# Patient Record
Sex: MALE | Race: BLACK OR AFRICAN AMERICAN | NOT HISPANIC OR LATINO | Employment: UNEMPLOYED | URBAN - METROPOLITAN AREA
[De-identification: names, ages, dates, MRNs, and addresses within clinical notes are randomized per-mention and may not be internally consistent; named-entity substitution may affect disease eponyms.]

---

## 2021-11-12 ENCOUNTER — HOSPITAL ENCOUNTER (OUTPATIENT)
Dept: NON INVASIVE DIAGNOSTICS | Facility: HOSPITAL | Age: 56
Discharge: HOME | End: 2021-11-12
Payer: COMMERCIAL

## 2021-11-12 VITALS
HEIGHT: 67 IN | DIASTOLIC BLOOD PRESSURE: 85 MMHG | WEIGHT: 174 LBS | HEART RATE: 60 BPM | SYSTOLIC BLOOD PRESSURE: 161 MMHG | BODY MASS INDEX: 27.31 KG/M2

## 2021-11-12 DIAGNOSIS — I10 HYPERTENSION, ESSENTIAL: ICD-10-CM

## 2021-11-12 LAB
AORTIC ROOT: 2.9 CM
APICAL FOUR CHAMBER EJECTION FRACTION: 17 %
ASCENDING AORTA: 3.1 CM
E WAVE DECELERATION TIME: 135 MS
FRACTIONAL SHORTENING: 8 % (ref 28–44)
INTERVENTRICULAR SEPTUM IN DIASTOLE (PARASTERNAL SHORT AXIS VIEW): 0.7 CM
LAAS-AP2: 18.3 CM2
LAAS-AP4: 22 CM2
LEFT INTERNAL DIMENSION IN SYSTOLE: 5.6 CM (ref 2.1–4)
LEFT VENTRICULAR INTERNAL DIMENSION IN DIASTOLE: 6.1 CM (ref 4.56–6.79)
LEFT VENTRICULAR POSTERIOR WALL IN END DIASTOLE: 0.8 CM
LEFT VENTRICULAR STROKE VOLUME: 40 ML
MV E'TISSUE VEL-SEP: 6 CM/S
MV PEAK A VEL: 0.88 M/S
MV PEAK E VEL: 95 CM/S
RIGHT ATRIAL 2D VOLUME: 25 ML
RIGHT ATRIUM AREA SYSTOLE A4C: 11.1 CM2
RIGHT VENTRICLE ID DIMENSION: 3.8 CM
SL CV LV EF: 20
SL CV PED ECHO LEFT VENTRICLE DIASTOLIC VOLUME (MOD BIPLANE) 2D: 190 ML
SL CV PED ECHO LEFT VENTRICLE SYSTOLIC VOLUME (MOD BIPLANE) 2D: 151 ML
TRICUSPID VALVE PEAK REGURGITATION VELOCITY: 2.13 M/S
TRICUSPID VALVE S': 52 CM/S
TV PEAK GRADIENT: 18 MMHG
Z-SCORE OF LEFT VENTRICULAR DIMENSION IN END SYSTOLE: 0.93

## 2021-11-12 PROCEDURE — 93306 TTE W/DOPPLER COMPLETE: CPT

## 2021-12-09 RX ORDER — UREA 10 %
1 LOTION (ML) TOPICAL DAILY
COMMUNITY
End: 2022-01-06

## 2021-12-09 RX ORDER — DIPHENOXYLATE HYDROCHLORIDE AND ATROPINE SULFATE 2.5; .025 MG/1; MG/1
1 TABLET ORAL DAILY
COMMUNITY
End: 2022-01-06

## 2021-12-09 RX ORDER — ROSUVASTATIN CALCIUM 5 MG/1
5 TABLET, COATED ORAL DAILY
COMMUNITY
Start: 2021-04-23 | End: 2021-12-09 | Stop reason: SDUPTHER

## 2021-12-09 RX ORDER — ROSUVASTATIN CALCIUM 5 MG/1
TABLET, COATED ORAL
COMMUNITY
Start: 2021-10-04 | End: 2021-12-28

## 2021-12-09 RX ORDER — AMLODIPINE BESYLATE 5 MG/1
5 TABLET ORAL DAILY
COMMUNITY
Start: 2021-03-26 | End: 2021-12-09 | Stop reason: SDUPTHER

## 2021-12-14 ENCOUNTER — OFFICE VISIT (OUTPATIENT)
Dept: CARDIOLOGY CLINIC | Facility: CLINIC | Age: 56
End: 2021-12-14
Payer: COMMERCIAL

## 2021-12-14 VITALS
HEIGHT: 67 IN | WEIGHT: 180 LBS | BODY MASS INDEX: 28.25 KG/M2 | DIASTOLIC BLOOD PRESSURE: 70 MMHG | OXYGEN SATURATION: 99 % | SYSTOLIC BLOOD PRESSURE: 124 MMHG | HEART RATE: 79 BPM

## 2021-12-14 DIAGNOSIS — R07.9 CHEST PAIN, UNSPECIFIED TYPE: Primary | ICD-10-CM

## 2021-12-14 DIAGNOSIS — I42.9 CARDIOMYOPATHY, UNSPECIFIED TYPE (HCC): ICD-10-CM

## 2021-12-14 DIAGNOSIS — R06.02 SHORTNESS OF BREATH: ICD-10-CM

## 2021-12-14 PROCEDURE — 93000 ELECTROCARDIOGRAM COMPLETE: CPT | Performed by: INTERNAL MEDICINE

## 2021-12-14 PROCEDURE — 99204 OFFICE O/P NEW MOD 45 MIN: CPT | Performed by: INTERNAL MEDICINE

## 2021-12-14 RX ORDER — ASPIRIN 81 MG/1
81 TABLET, CHEWABLE ORAL DAILY
Qty: 90 TABLET | Refills: 3 | Status: SHIPPED | OUTPATIENT
Start: 2021-12-14

## 2021-12-14 RX ORDER — LOSARTAN POTASSIUM 25 MG/1
12.5 TABLET ORAL DAILY
Qty: 90 TABLET | Refills: 0 | Status: SHIPPED | OUTPATIENT
Start: 2021-12-14 | End: 2021-12-28 | Stop reason: ALTCHOICE

## 2021-12-14 RX ORDER — METOPROLOL SUCCINATE 25 MG/1
25 TABLET, EXTENDED RELEASE ORAL DAILY
Qty: 90 TABLET | Refills: 3 | Status: SHIPPED | OUTPATIENT
Start: 2021-12-14

## 2021-12-14 RX ORDER — ASPIRIN 81 MG/1
324 TABLET, CHEWABLE ORAL ONCE
Status: CANCELLED | OUTPATIENT
Start: 2021-12-14 | End: 2021-12-14

## 2021-12-15 ENCOUNTER — TELEPHONE (OUTPATIENT)
Dept: CARDIOLOGY CLINIC | Facility: CLINIC | Age: 56
End: 2021-12-15

## 2021-12-16 ENCOUNTER — PREP FOR PROCEDURE (OUTPATIENT)
Dept: CARDIOLOGY CLINIC | Facility: CLINIC | Age: 56
End: 2021-12-16

## 2021-12-16 DIAGNOSIS — R07.9 CHEST PAIN, UNSPECIFIED TYPE: Primary | ICD-10-CM

## 2021-12-17 ENCOUNTER — APPOINTMENT (OUTPATIENT)
Dept: LAB | Facility: HOSPITAL | Age: 56
End: 2021-12-17
Attending: INTERNAL MEDICINE
Payer: COMMERCIAL

## 2021-12-17 DIAGNOSIS — R07.9 CHEST PAIN, UNSPECIFIED TYPE: ICD-10-CM

## 2021-12-17 LAB
ALBUMIN SERPL BCP-MCNC: 3.8 G/DL (ref 3.5–5)
ALP SERPL-CCNC: 78 U/L (ref 46–116)
ALT SERPL W P-5'-P-CCNC: 40 U/L (ref 12–78)
ANION GAP SERPL CALCULATED.3IONS-SCNC: 10 MMOL/L (ref 4–13)
AST SERPL W P-5'-P-CCNC: 24 U/L (ref 5–45)
BASOPHILS # BLD AUTO: 0.06 THOUSANDS/ΜL (ref 0–0.1)
BASOPHILS NFR BLD AUTO: 1 % (ref 0–1)
BILIRUB SERPL-MCNC: 0.24 MG/DL (ref 0.2–1)
BUN SERPL-MCNC: 15 MG/DL (ref 5–25)
CALCIUM SERPL-MCNC: 9.3 MG/DL (ref 8.3–10.1)
CHLORIDE SERPL-SCNC: 104 MMOL/L (ref 100–108)
CO2 SERPL-SCNC: 27 MMOL/L (ref 21–32)
CREAT SERPL-MCNC: 0.9 MG/DL (ref 0.6–1.3)
EOSINOPHIL # BLD AUTO: 0.18 THOUSAND/ΜL (ref 0–0.61)
EOSINOPHIL NFR BLD AUTO: 2 % (ref 0–6)
ERYTHROCYTE [DISTWIDTH] IN BLOOD BY AUTOMATED COUNT: 14.6 % (ref 11.6–15.1)
GFR SERPL CREATININE-BSD FRML MDRD: 96 ML/MIN/1.73SQ M
GLUCOSE SERPL-MCNC: 112 MG/DL (ref 65–140)
HCT VFR BLD AUTO: 42.7 % (ref 36.5–49.3)
HGB BLD-MCNC: 13.5 G/DL (ref 12–17)
IMM GRANULOCYTES # BLD AUTO: 0.05 THOUSAND/UL (ref 0–0.2)
IMM GRANULOCYTES NFR BLD AUTO: 1 % (ref 0–2)
LYMPHOCYTES # BLD AUTO: 4.1 THOUSANDS/ΜL (ref 0.6–4.47)
LYMPHOCYTES NFR BLD AUTO: 38 % (ref 14–44)
MCH RBC QN AUTO: 27.3 PG (ref 26.8–34.3)
MCHC RBC AUTO-ENTMCNC: 31.6 G/DL (ref 31.4–37.4)
MCV RBC AUTO: 86 FL (ref 82–98)
MONOCYTES # BLD AUTO: 1.09 THOUSAND/ΜL (ref 0.17–1.22)
MONOCYTES NFR BLD AUTO: 10 % (ref 4–12)
NEUTROPHILS # BLD AUTO: 5.41 THOUSANDS/ΜL (ref 1.85–7.62)
NEUTS SEG NFR BLD AUTO: 48 % (ref 43–75)
NRBC BLD AUTO-RTO: 0 /100 WBCS
PLATELET # BLD AUTO: 220 THOUSANDS/UL (ref 149–390)
PMV BLD AUTO: 10.4 FL (ref 8.9–12.7)
POTASSIUM SERPL-SCNC: 4.3 MMOL/L (ref 3.5–5.3)
PROT SERPL-MCNC: 7.7 G/DL (ref 6.4–8.2)
RBC # BLD AUTO: 4.94 MILLION/UL (ref 3.88–5.62)
SODIUM SERPL-SCNC: 141 MMOL/L (ref 136–145)
WBC # BLD AUTO: 10.89 THOUSAND/UL (ref 4.31–10.16)

## 2021-12-17 PROCEDURE — 80053 COMPREHEN METABOLIC PANEL: CPT

## 2021-12-17 PROCEDURE — 36415 COLL VENOUS BLD VENIPUNCTURE: CPT

## 2021-12-17 PROCEDURE — 85025 COMPLETE CBC W/AUTO DIFF WBC: CPT

## 2021-12-22 ENCOUNTER — HOSPITAL ENCOUNTER (OUTPATIENT)
Facility: HOSPITAL | Age: 56
Setting detail: OUTPATIENT SURGERY
Discharge: HOME | End: 2021-12-22
Attending: INTERNAL MEDICINE | Admitting: INTERNAL MEDICINE
Payer: COMMERCIAL

## 2021-12-22 VITALS
HEART RATE: 63 BPM | SYSTOLIC BLOOD PRESSURE: 137 MMHG | RESPIRATION RATE: 16 BRPM | OXYGEN SATURATION: 99 % | TEMPERATURE: 98.3 F | HEIGHT: 67 IN | DIASTOLIC BLOOD PRESSURE: 85 MMHG | BODY MASS INDEX: 28.25 KG/M2 | WEIGHT: 180 LBS

## 2021-12-22 DIAGNOSIS — I42.9 CARDIOMYOPATHY, UNSPECIFIED TYPE (HCC): ICD-10-CM

## 2021-12-22 PROBLEM — Z98.890 S/P CARDIAC CATH: Status: ACTIVE | Noted: 2021-12-22

## 2021-12-22 PROBLEM — I51.9 CARDIOPATHY: Chronic | Status: ACTIVE | Noted: 2021-12-22

## 2021-12-22 LAB
ANION GAP SERPL CALCULATED.3IONS-SCNC: 3 MMOL/L (ref 4–13)
APTT PPP: 28 SECONDS (ref 23–37)
ATRIAL RATE: 63 BPM
BUN SERPL-MCNC: 16 MG/DL (ref 5–25)
CALCIUM SERPL-MCNC: 9.1 MG/DL (ref 8.3–10.1)
CHLORIDE SERPL-SCNC: 110 MMOL/L (ref 100–108)
CO2 SERPL-SCNC: 27 MMOL/L (ref 21–32)
CREAT SERPL-MCNC: 0.99 MG/DL (ref 0.6–1.3)
ERYTHROCYTE [DISTWIDTH] IN BLOOD BY AUTOMATED COUNT: 14.7 % (ref 11.6–15.1)
GFR SERPL CREATININE-BSD FRML MDRD: 85 ML/MIN/1.73SQ M
GLUCOSE P FAST SERPL-MCNC: 116 MG/DL (ref 65–99)
GLUCOSE SERPL-MCNC: 116 MG/DL (ref 65–140)
HCT VFR BLD AUTO: 40.5 % (ref 36.5–49.3)
HGB BLD-MCNC: 12.9 G/DL (ref 12–17)
MCH RBC QN AUTO: 27.9 PG (ref 26.8–34.3)
MCHC RBC AUTO-ENTMCNC: 31.9 G/DL (ref 31.4–37.4)
MCV RBC AUTO: 88 FL (ref 82–98)
P AXIS: 41 DEGREES
PLATELET # BLD AUTO: 195 THOUSANDS/UL (ref 149–390)
PMV BLD AUTO: 10.1 FL (ref 8.9–12.7)
POTASSIUM SERPL-SCNC: 4.3 MMOL/L (ref 3.5–5.3)
PR INTERVAL: 138 MS
QRS AXIS: 52 DEGREES
QRSD INTERVAL: 108 MS
QT INTERVAL: 424 MS
QTC INTERVAL: 433 MS
RBC # BLD AUTO: 4.62 MILLION/UL (ref 3.88–5.62)
SODIUM SERPL-SCNC: 140 MMOL/L (ref 136–145)
T WAVE AXIS: 265 DEGREES
VENTRICULAR RATE: 63 BPM
WBC # BLD AUTO: 8.21 THOUSAND/UL (ref 4.31–10.16)

## 2021-12-22 PROCEDURE — 85730 THROMBOPLASTIN TIME PARTIAL: CPT | Performed by: INTERNAL MEDICINE

## 2021-12-22 PROCEDURE — 93005 ELECTROCARDIOGRAM TRACING: CPT

## 2021-12-22 PROCEDURE — 93010 ELECTROCARDIOGRAM REPORT: CPT | Performed by: INTERNAL MEDICINE

## 2021-12-22 PROCEDURE — 85027 COMPLETE CBC AUTOMATED: CPT | Performed by: INTERNAL MEDICINE

## 2021-12-22 PROCEDURE — 93454 CORONARY ARTERY ANGIO S&I: CPT | Performed by: INTERNAL MEDICINE

## 2021-12-22 PROCEDURE — 99152 MOD SED SAME PHYS/QHP 5/>YRS: CPT | Performed by: INTERNAL MEDICINE

## 2021-12-22 PROCEDURE — 99153 MOD SED SAME PHYS/QHP EA: CPT | Performed by: INTERNAL MEDICINE

## 2021-12-22 PROCEDURE — C1894 INTRO/SHEATH, NON-LASER: HCPCS | Performed by: INTERNAL MEDICINE

## 2021-12-22 PROCEDURE — C1769 GUIDE WIRE: HCPCS | Performed by: INTERNAL MEDICINE

## 2021-12-22 PROCEDURE — 80048 BASIC METABOLIC PNL TOTAL CA: CPT | Performed by: INTERNAL MEDICINE

## 2021-12-22 RX ORDER — HEPARIN SODIUM 1000 [USP'U]/ML
INJECTION, SOLUTION INTRAVENOUS; SUBCUTANEOUS AS NEEDED
Status: DISCONTINUED | OUTPATIENT
Start: 2021-12-22 | End: 2021-12-22 | Stop reason: HOSPADM

## 2021-12-22 RX ORDER — NITROGLYCERIN 20 MG/100ML
INJECTION INTRAVENOUS AS NEEDED
Status: DISCONTINUED | OUTPATIENT
Start: 2021-12-22 | End: 2021-12-22 | Stop reason: HOSPADM

## 2021-12-22 RX ORDER — FENTANYL CITRATE 50 UG/ML
INJECTION, SOLUTION INTRAMUSCULAR; INTRAVENOUS AS NEEDED
Status: DISCONTINUED | OUTPATIENT
Start: 2021-12-22 | End: 2021-12-22 | Stop reason: HOSPADM

## 2021-12-22 RX ORDER — ACETAMINOPHEN 325 MG/1
650 TABLET ORAL EVERY 4 HOURS PRN
Status: DISCONTINUED | OUTPATIENT
Start: 2021-12-22 | End: 2021-12-22 | Stop reason: HOSPADM

## 2021-12-22 RX ORDER — SODIUM CHLORIDE 9 MG/ML
125 INJECTION, SOLUTION INTRAVENOUS CONTINUOUS
Status: DISCONTINUED | OUTPATIENT
Start: 2021-12-22 | End: 2021-12-22

## 2021-12-22 RX ORDER — ONDANSETRON 2 MG/ML
4 INJECTION INTRAMUSCULAR; INTRAVENOUS EVERY 6 HOURS PRN
Status: DISCONTINUED | OUTPATIENT
Start: 2021-12-22 | End: 2021-12-22 | Stop reason: HOSPADM

## 2021-12-22 RX ORDER — MIDAZOLAM HYDROCHLORIDE 2 MG/2ML
INJECTION, SOLUTION INTRAMUSCULAR; INTRAVENOUS AS NEEDED
Status: DISCONTINUED | OUTPATIENT
Start: 2021-12-22 | End: 2021-12-22 | Stop reason: HOSPADM

## 2021-12-22 RX ORDER — SODIUM CHLORIDE 9 MG/ML
125 INJECTION, SOLUTION INTRAVENOUS CONTINUOUS
Status: DISCONTINUED | OUTPATIENT
Start: 2021-12-22 | End: 2021-12-22 | Stop reason: HOSPADM

## 2021-12-22 RX ORDER — ASPIRIN 81 MG/1
324 TABLET, CHEWABLE ORAL ONCE
Status: COMPLETED | OUTPATIENT
Start: 2021-12-22 | End: 2021-12-22

## 2021-12-22 RX ORDER — LIDOCAINE HYDROCHLORIDE 10 MG/ML
INJECTION, SOLUTION EPIDURAL; INFILTRATION; INTRACAUDAL; PERINEURAL AS NEEDED
Status: DISCONTINUED | OUTPATIENT
Start: 2021-12-22 | End: 2021-12-22 | Stop reason: HOSPADM

## 2021-12-22 RX ORDER — VERAPAMIL HCL 2.5 MG/ML
AMPUL (ML) INTRAVENOUS AS NEEDED
Status: DISCONTINUED | OUTPATIENT
Start: 2021-12-22 | End: 2021-12-22 | Stop reason: HOSPADM

## 2021-12-22 RX ADMIN — ASPIRIN 81 MG CHEWABLE TABLET 324 MG: 81 TABLET CHEWABLE at 07:14

## 2021-12-22 RX ADMIN — SODIUM CHLORIDE 125 ML/HR: 0.9 INJECTION, SOLUTION INTRAVENOUS at 07:16

## 2021-12-27 ENCOUNTER — TELEPHONE (OUTPATIENT)
Dept: CARDIOLOGY CLINIC | Facility: CLINIC | Age: 56
End: 2021-12-27

## 2021-12-28 DIAGNOSIS — I42.9 CARDIOMYOPATHY, UNSPECIFIED TYPE (HCC): Primary | ICD-10-CM

## 2021-12-28 DIAGNOSIS — I25.10 CORONARY ARTERY DISEASE INVOLVING NATIVE CORONARY ARTERY OF NATIVE HEART WITHOUT ANGINA PECTORIS: ICD-10-CM

## 2021-12-28 RX ORDER — ROSUVASTATIN CALCIUM 20 MG/1
20 TABLET, COATED ORAL DAILY
Qty: 90 TABLET | Refills: 3 | Status: SHIPPED | OUTPATIENT
Start: 2021-12-28

## 2022-01-03 ENCOUNTER — TELEPHONE (OUTPATIENT)
Dept: CARDIOLOGY CLINIC | Facility: CLINIC | Age: 57
End: 2022-01-03

## 2022-01-04 NOTE — TELEPHONE ENCOUNTER
Per [employer] patient's current entresto price is $1600  Due to a $7450 00 deductible   Patient was instructed to obtain a $10 00 coupon card

## 2022-01-06 ENCOUNTER — OFFICE VISIT (OUTPATIENT)
Dept: CARDIOLOGY CLINIC | Facility: CLINIC | Age: 57
End: 2022-01-06
Payer: COMMERCIAL

## 2022-01-06 VITALS
HEART RATE: 79 BPM | OXYGEN SATURATION: 97 % | HEIGHT: 67 IN | BODY MASS INDEX: 28.63 KG/M2 | WEIGHT: 182.4 LBS | DIASTOLIC BLOOD PRESSURE: 80 MMHG | SYSTOLIC BLOOD PRESSURE: 142 MMHG

## 2022-01-06 DIAGNOSIS — I42.9 CARDIOMYOPATHY, UNSPECIFIED TYPE (HCC): Primary | ICD-10-CM

## 2022-01-06 DIAGNOSIS — I10 HYPERTENSION, UNSPECIFIED TYPE: ICD-10-CM

## 2022-01-06 DIAGNOSIS — Z72.0 TOBACCO ABUSE: ICD-10-CM

## 2022-01-06 DIAGNOSIS — E78.5 HYPERLIPIDEMIA, UNSPECIFIED HYPERLIPIDEMIA TYPE: ICD-10-CM

## 2022-01-06 PROCEDURE — 99215 OFFICE O/P EST HI 40 MIN: CPT | Performed by: NURSE PRACTITIONER

## 2022-01-06 NOTE — PROGRESS NOTES
Cardiology Follow Up    Nathalie Grant  1965  [id number]  [facility] [address]  [phone]    1  Cardiomyopathy, unspecified type ([facility] [address] )  MRI cardiac  w wo contrast   2  Hypertension, unspecified type     3  Hyperlipidemia, unspecified hyperlipidemia type     4  Tobacco abuse         Interval History:   On 12/22/21 Mr Osiel Peralta underwent a [facility] due to newly discovered CM LVEF 20%  [facility] showed  1 vessel CAD with chronically occluded circumflex vessel  1st diagonal lesion 90% stenosis small vessel small territory medical management  Proximal circumflex 100% stenosis, mid RCA 40% stenosis, proximal RCA 30% stenosis  Mr Nathalie Grant presents to our office for a recent [facility] follow up visit  He [facility] today  I have reviewed the results to the [facility]  Denies dyspnea with minimal exertion chest pain palpitations lightheadedness or dizziness  He is abiding by 2 g sodium diet fluid restriction  His weight in the office is 182 lb  HPI:  Hypertension  Hyperlipidemia  Tobacco use   11/12/21 TTE: LV  Cavity mildly dilated, wall thickness mildly increased, increased trabeculation in the apex of the LV myocardium suggesting LV noncompaction morphology  LVEF 20%  Systolic function severely reduced  Akinesis and thinning of the LV myocardium from the mid to basal aspect of the inferior wall  Grade 2 diastolic dysfunction left atrium mildly dilated, aortic valve mild regurgitation  Mitral valve thickening of the anterior leaflet and posterior leaflet mild chordal involvement,   Patient Active Problem List   Diagnosis    [facility]    Cardiopathy    S/P cardiac cath     No past medical history on file    Social History     Socioeconomic History    Marital status: Single     Spouse name: Not on file    Number of children: Not on file    Years of education: Not on file    Highest education level: Not on file   Occupational History    Not on file   Tobacco Use    Smoking status: Current Every Day Smoker     Start date: 12/14/1990    Smokeless tobacco: Never Used   Substance and Sexual Activity    Alcohol use: Yes     Alcohol/week: 1 0 standard drink     Types: 1 Cans of beer per week    Drug use: Yes     Frequency: 7 0 times per week     Types: Marijuana    Sexual activity: Yes     Partners: Female   Other Topics Concern    Not on file   Social History Narrative    Not on file     Social Determinants of Health     Financial Resource Strain: Not on file   Food Insecurity: Not on file   Transportation Needs: Not on file   Physical Activity: Not on file   Stress: Not on file   Social Connections: Not on file   Intimate Partner Violence: Not on file   Housing Stability: Not on file      No family history on file  Past Surgical History:   Procedure Laterality Date    CARDIAC CATHETERIZATION Left 12/22/2021    Procedure: CARDIAC CORONARY ANGIOGRAM;  Surgeon: Reyes Garduno DO;  [facility];   Service: Cardiology       Current Outpatient Medications:     aspirin 81 mg chewable tablet, Chew 1 tablet (81 mg total) daily, Disp: 90 tablet, Rfl: 3    Cholecalciferol 25 MCG (1000 UT) tablet, Take 1,000 Units by mouth daily, Disp: , Rfl:     metoprolol succinate (TOPROL-XL) 25 mg 24 hr tablet, Take 1 tablet (25 mg total) by mouth daily, Disp: 90 tablet, Rfl: 3    rosuvastatin (CRESTOR) 20 MG tablet, Take 1 tablet (20 mg total) by mouth daily, Disp: 90 tablet, Rfl: 3    sacubitril-valsartan (ENTRESTO) 24-26 MG TABS, Take 1 tablet by mouth 2 (two) times a day, Disp: 180 tablet, Rfl: 0  No Known Allergies    Labs:  Admission on 12/22/2021, Discharged on 12/22/2021   Component Date Value    PTT 12/22/2021 28     Sodium 12/22/2021 140     Potassium 12/22/2021 4 3     Chloride 12/22/2021 110*    CO2 12/22/2021 27     ANION GAP 12/22/2021 3*    BUN 12/22/2021 16     Creatinine 12/22/2021 0 99     Glucose 12/22/2021 116     Glucose, Fasting 12/22/2021 116*    Calcium 12/22/2021 9 1     eGFR 12/22/2021 85     WBC 12/22/2021 8 21     RBC 12/22/2021 4 62     Hemoglobin 12/22/2021 12 9     Hematocrit 12/22/2021 40 5     MCV 12/22/2021 88     MCH 12/22/2021 27 9     MCHC 12/22/2021 31 9     RDW 12/22/2021 14 7     Platelets 70/99/4515 195     MPV 12/22/2021 10 1     Ventricular Rate 12/22/2021 63     Atrial Rate 12/22/2021 63     RI Interval 12/22/2021 138     QRSD Interval 12/22/2021 108     QT Interval 12/22/2021 424     QTC Interval 12/22/2021 433     P Axis 12/22/2021 41     QRS Axis 12/22/2021 52     T Wave Axis 12/22/2021 265    Appointment on 12/17/2021   Component Date Value    Sodium 12/17/2021 141     Potassium 12/17/2021 4 3     Chloride 12/17/2021 104     CO2 12/17/2021 27     ANION GAP 12/17/2021 10     BUN 12/17/2021 15     Creatinine 12/17/2021 0 90     Glucose 12/17/2021 112     Calcium 12/17/2021 9 3     AST 12/17/2021 24     ALT 12/17/2021 40     Alkaline Phosphatase 12/17/2021 78     Total Protein 12/17/2021 7 7     Albumin 12/17/2021 3 8     Total Bilirubin 12/17/2021 0 24     eGFR 12/17/2021 96     WBC 12/17/2021 10 89*    RBC 12/17/2021 4 94     Hemoglobin 12/17/2021 13 5     Hematocrit 12/17/2021 42 7     MCV 12/17/2021 86     MCH 12/17/2021 27 3     MCHC 12/17/2021 31 6     RDW 12/17/2021 14 6     MPV 12/17/2021 10 4     Platelets 77/51/5555 220     nRBC 12/17/2021 0     Neutrophils Relative 12/17/2021 48     Immat GRANS % 12/17/2021 1     Lymphocytes Relative 12/17/2021 38     Monocytes Relative 12/17/2021 10     Eosinophils Relative 12/17/2021 2     Basophils Relative 12/17/2021 1     Neutrophils Absolute 12/17/2021 5 41     Immature Grans Absolute 12/17/2021 0 05     Lymphocytes Absolute 12/17/2021 4 10     Monocytes Absolute 12/17/2021 1 09     Eosinophils Absolute 12/17/2021 0 18     Basophils Absolute 12/17/2021 0 06    Hospital Outpatient Visit on 11/12/2021   Component Date Value    TV S' 11/12/2021 52 0     TV peak gradient 11/12/2021 18     Tricuspid valve peak reg* 11/12/2021 2 13     LVPWd 11/12/2021 0 80     Left Atrium Area-systoli* 11/12/2021 18 3     Left Atrium Area-systoli* 11/12/2021 22     MV E' Tissue Velocity Se* 11/12/2021 6     RA 2D Volume 11/12/2021 25 0     IVSd 11/12/2021 8 17     LV DIASTOLIC VOLUME (MOD* 70/50/6027 190     LEFT VENTRICLE SYSTOLIC * 48/05/4245 848     Left ventricular stroke * 11/12/2021 40 00     A4C EF 11/12/2021 17     LVIDd 11/12/2021 6 10     LVIDS 11/12/2021 5 60     FS 11/12/2021 8     Asc Ao 11/12/2021 3 1     Ao root 11/12/2021 2 90     RVID d 11/12/2021 3 8     E wave deceleration time 11/12/2021 135     MV Peak E Blaise 11/12/2021 95     MV Peak A Blaise 11/12/2021 0 88     RAA A4C 11/12/2021 11 1     ZLVIDS 11/12/2021 0 93     LV EF 11/12/2021 20      Imaging: Cardiac catheterization    Addendum Date: 12/22/2021 Addendum:   · 1V CAD with chronically occluded circumflex vessel · Mixed cardiomyopathy, mostly nonischemic features as occluded circumflex does not explain LVEF 20% · 1st Diag lesion is 90% stenosed, small vessel, small territory (medical therapy) · Prox Cx lesion is 100% stenosed  · Mid RCA lesion is 40% stenosed  · Prox RCA lesion is 30% stenosed  Result Date: 12/22/2021  Narrative: · 1V CAD with chronically occluded circumflex vessel · Mixed cardiomyopathy, mostly nonischemic features as occluded circumflex does not explain LVEF 20% · 1st Diag lesion is 90% stenosed, small vessel, small territory (medical therapy) · Prox Cx lesion is 100% stenosed  · Mid RCA lesion is 40% stenosed  · Prox RCA lesion is 30% stenosed  Review of Systems:  Review of Systems   Constitutional: Positive for fatigue  Respiratory: Positive for shortness of breath           With moderate exertion    All other systems reviewed and are negative  Physical Exam:  Physical Exam  Vitals reviewed  Constitutional:       Appearance: Normal appearance  HENT:      Head: Normocephalic  Eyes:      Extraocular Movements: Extraocular movements intact  Pupils: Pupils are equal, round, and reactive to light  Neck:      Comments: No JVD  Cardiovascular:      Rate and Rhythm: Normal rate and regular rhythm  Pulses: Normal pulses  Heart sounds: Normal heart sounds  Pulmonary:      Effort: Pulmonary effort is normal       Breath sounds: Normal breath sounds  Abdominal:      General: Bowel sounds are normal       Palpations: Abdomen is soft  Musculoskeletal:         General: Normal range of motion  Cervical back: Normal range of motion and neck supple  Right lower leg: No edema  Left lower leg: No edema  Skin:     General: Skin is warm and dry  Capillary Refill: Capillary refill takes less than 2 seconds  Neurological:      General: No focal deficit present  Mental Status: He is alert and oriented to person, place, and time  Psychiatric:         Mood and Affect: Mood normal          Behavior: Behavior normal          Discussion/Summary:  1  Mixed Ischemic and non ischemic CM LVEF 20% LVIDd 6 1cm, TTE showed increased Trabeculation in the apex of LV suggestive of non compaction morphology  LHC showed  % stenosis  Colette Ga started Cite [employer] this morning  Continue Entresto 24-26 mg b i d  Metoprolol succinate 25 mg daily, Crestor 20 mg daily, aspirin 81 mg daily  Follow-up cardiac MRI in the near future  Discussed goals up titrate medications and follow-up TTE in 3 months to evaluate LVEF if no improvement with suggest he undergo insertion of ICD  I have reinforced heart failure education 2 g sodium diet 1500 cc to 2 L fluid restriction and daily weights    I have educated on Colette Ga when to call our office if he experiences heart failure symptoms, I would suggest Heart Failure consult for co management of this patient  2  Hypertension continue on Entresto 24-26mg BID up titrate in the near future if tolerating  Continue metoprolol succinate 25 mg daily  BMP in one week  3  Hyperlipidemia 3/26/21 , , HDL 36, ,  Continue on Crestor 20 mg daily the follow-up lipid panel in the near future heart healthy diet  4   Tobacco use continues to smoke,encourage smoking cessation

## 2022-01-06 NOTE — PATIENT INSTRUCTIONS
Maintain a 2 gram daily sodium diet and 1500 ml daily fluid restriction  Check daily weights  If you gained 3 pounds in one day, 5 pounds in one week, or experience worsening shortness of breath or increasing lower leg swelling  Please call the heart failure office at [phone]    Please bring a  list of your current medications and daily weights to the office visit    Cardiac MRI

## 2022-01-18 ENCOUNTER — HOSPITAL ENCOUNTER (OUTPATIENT)
Dept: MRI IMAGING | Facility: HOSPITAL | Age: 57
Discharge: HOME | End: 2022-01-18
Payer: COMMERCIAL

## 2022-01-18 DIAGNOSIS — I42.9 CARDIOMYOPATHY, UNSPECIFIED TYPE (HCC): ICD-10-CM

## 2022-01-18 PROCEDURE — G1004 CDSM NDSC: HCPCS

## 2022-01-18 PROCEDURE — 75561 CARDIAC MRI FOR MORPH W/DYE: CPT

## 2022-01-18 PROCEDURE — A9585 GADOBUTROL INJECTION: HCPCS | Performed by: NURSE PRACTITIONER

## 2022-01-18 RX ADMIN — GADOBUTROL 16 ML: 604.72 INJECTION INTRAVENOUS at 19:46

## 2022-01-20 ENCOUNTER — TELEPHONE (OUTPATIENT)
Dept: CARDIOLOGY CLINIC | Facility: CLINIC | Age: 57
End: 2022-01-20

## 2022-01-20 ENCOUNTER — CLINICAL SUPPORT (OUTPATIENT)
Dept: CARDIOLOGY CLINIC | Facility: CLINIC | Age: 57
End: 2022-01-20
Payer: COMMERCIAL

## 2022-01-20 ENCOUNTER — APPOINTMENT (OUTPATIENT)
Dept: LAB | Facility: HOSPITAL | Age: 57
End: 2022-01-20
Attending: INTERNAL MEDICINE
Payer: COMMERCIAL

## 2022-01-20 VITALS
HEIGHT: 67 IN | BODY MASS INDEX: 28.58 KG/M2 | DIASTOLIC BLOOD PRESSURE: 80 MMHG | HEART RATE: 72 BPM | SYSTOLIC BLOOD PRESSURE: 134 MMHG | OXYGEN SATURATION: 98 % | WEIGHT: 182.1 LBS

## 2022-01-20 DIAGNOSIS — I42.9 CARDIOMYOPATHY, UNSPECIFIED TYPE (HCC): ICD-10-CM

## 2022-01-20 DIAGNOSIS — I10 HYPERTENSION, UNSPECIFIED TYPE: Primary | ICD-10-CM

## 2022-01-20 LAB
ANION GAP SERPL CALCULATED.3IONS-SCNC: 10 MMOL/L (ref 4–13)
BUN SERPL-MCNC: 14 MG/DL (ref 5–25)
CALCIUM SERPL-MCNC: 8.7 MG/DL (ref 8.3–10.1)
CHLORIDE SERPL-SCNC: 103 MMOL/L (ref 100–108)
CO2 SERPL-SCNC: 26 MMOL/L (ref 21–32)
CREAT SERPL-MCNC: 1.11 MG/DL (ref 0.6–1.3)
GFR SERPL CREATININE-BSD FRML MDRD: 74 ML/MIN/1.73SQ M
GLUCOSE SERPL-MCNC: 211 MG/DL (ref 65–140)
POTASSIUM SERPL-SCNC: 4.8 MMOL/L (ref 3.5–5.3)
SODIUM SERPL-SCNC: 139 MMOL/L (ref 136–145)

## 2022-01-20 PROCEDURE — 36415 COLL VENOUS BLD VENIPUNCTURE: CPT

## 2022-01-20 PROCEDURE — 99211 OFF/OP EST MAY X REQ PHY/QHP: CPT

## 2022-01-20 PROCEDURE — 80048 BASIC METABOLIC PNL TOTAL CA: CPT

## 2022-01-20 NOTE — TELEPHONE ENCOUNTER
S/w Colette Ga, he does not have a BP cuff at home  Scheduled for BP check today at 1pm  Needs to have BMP completed as well-- he will go today

## 2022-01-20 NOTE — PROGRESS NOTES
Mr Taina Heath is here today under the direction of Cliff Pulliam NP for bp check  Pt has no cardiac complaints  Pt states since he started Cite [employer] Gad[facility]m he noticed dark spots on his hands  Reviewed list of medications with pt  BP in office 134/80 hr 72    Blood pressure reviewed by Cliff Pulliam NP   Pt was also seen today by ehsan STACY, and she adjusted Entresto to 49/51 mg twice daily  Provided pt with samples 1 month supply of Entresto 49/51 mg

## 2022-01-20 NOTE — TELEPHONE ENCOUNTER
----- Message from Nataly Padilla, [address] sent at 1/6/2022  4:36 PM EST -----  Regarding: phone call  Can you call Mr Nika Villa in 2 weeks evaluate BP for up titration of Entresto    Thank you Camilla Pond

## 2022-01-28 ENCOUNTER — TELEPHONE (OUTPATIENT)
Dept: CARDIOLOGY CLINIC | Facility: CLINIC | Age: 57
End: 2022-01-28

## 2022-01-31 ENCOUNTER — TELEPHONE (OUTPATIENT)
Dept: CARDIOLOGY CLINIC | Facility: CLINIC | Age: 57
End: 2022-01-31

## 2022-01-31 DIAGNOSIS — I42.9 CARDIOMYOPATHY, UNSPECIFIED TYPE (HCC): ICD-10-CM

## 2022-01-31 NOTE — TELEPHONE ENCOUNTER
Please let me know when you sign off on patient script-Pt is applying for Assistance for Entresto     Thanks    This has to be in your name -Because he is under assistance

## 2022-02-08 ENCOUNTER — OFFICE VISIT (OUTPATIENT)
Dept: CARDIOLOGY CLINIC | Facility: CLINIC | Age: 57
End: 2022-02-08
Payer: COMMERCIAL

## 2022-02-08 VITALS
SYSTOLIC BLOOD PRESSURE: 110 MMHG | BODY MASS INDEX: 28.88 KG/M2 | HEART RATE: 72 BPM | DIASTOLIC BLOOD PRESSURE: 70 MMHG | OXYGEN SATURATION: 97 % | HEIGHT: 67 IN | WEIGHT: 184 LBS

## 2022-02-08 DIAGNOSIS — I25.10 CORONARY ARTERY DISEASE INVOLVING NATIVE CORONARY ARTERY OF NATIVE HEART WITHOUT ANGINA PECTORIS: Primary | ICD-10-CM

## 2022-02-08 DIAGNOSIS — I42.0 DILATED CARDIOMYOPATHY (HCC): ICD-10-CM

## 2022-02-08 DIAGNOSIS — I42.9 CARDIOMYOPATHY, UNSPECIFIED TYPE (HCC): ICD-10-CM

## 2022-02-08 PROBLEM — E78.2 MIXED HYPERLIPIDEMIA: Status: ACTIVE | Noted: 2022-02-08

## 2022-02-08 PROCEDURE — 99213 OFFICE O/P EST LOW 20 MIN: CPT | Performed by: INTERNAL MEDICINE

## 2022-02-08 NOTE — PATIENT INSTRUCTIONS
You were seen today in the Cardiology office for follow up  Please continue all your medications as prescribed  I will refer your to our Heart Failure colleagues for a consultation  We discussed the benefits of smoking cessation, healthy low-fat diet, salt reduction, fluid restriction  Thank you for choosing [address]  Please call our office or use [employer] with any questions

## 2022-02-08 NOTE — PROGRESS NOTES
[facility]    CHIEF COMPLAINT:   Chief Complaint   Patient presents with    Follow-up       HPI:  Arpan Duran is a 49 y o  male with a past medical history hypertension and hyperlipidemia who presents for follow up  History of hypertension diagnosed 20+ years ago  Treated with amlodipine with good control  History of hyperlipidemia on rosuvastatin  EST in April 2021 at [facility] for mid-upper back pain which noted below average functional capacity  He reached 70% of his MPHR and his stress ECG was nondiagnostic  Baseline resting ECG with inferior and lateral T-wave inversions were noted  TTE performed on 11/12/2021 which revealed mildly dilated LV, increased trabeculation of the LV apex and severely reduced systolic function with estimated LVEF 20%  Basal to mid inferior wall akinesis and thinning  Basal to mid anterior wall and inferolateral wall severe hypokinesis  Seen in the office in 12/2021 and sent for [facility] which revealed chronically occluded LCx, D1 90% proximal stenosis (small vessel), 40% mid RCA, 30% prox RCA, LAD with MLI  Started on Cite El Gadhoum in January 2022  Interval history: Still smoking marijuana daily and mixes it with tobacco  Taking all cardiac medications as prescribed  He states he feels better with Cite [place] Gadhoum and has less shortness of breath  Weighing himself intermittently at home  He has no complaints of fever, chills, fatigue, new visual changes, lightheadedness, syncope, chest pain, palpitations, shortness of breath at rest or with exertion, orthopnea, PND, nausea, vomiting, diarrhea, dark or bright red blood in stools, lower extremity swelling  Walked over 1 mile yesterday to a friends house without any symptom limitations      The following portions of the patient's history were reviewed and updated as appropriate: allergies, current medications, past family history, past medical history, past social history, past surgical history, and problem list     Driss Simpson LAST OV I REVIEWED WITH THE PATIENT THE INTERIM LABS, TEST RESULTS, CONSULTANT(S) NOTES AND PERFORMED AN INTERIM REVIEW OF HISTORY    History reviewed  No pertinent past medical history  Past Surgical History:   Procedure Laterality Date    CARDIAC CATHETERIZATION Left 12/22/2021    Procedure: CARDIAC CORONARY ANGIOGRAM;  Surgeon: Gilberto Kirby DO;  Location: [facility]; Service: Cardiology       Social History     Socioeconomic History    Marital status: Single     Spouse name: Not on file    Number of children: Not on file    Years of education: Not on file    Highest education level: Not on file   Occupational History    Not on file   Tobacco Use    Smoking status: Current Every Day Smoker     Start date: 12/14/1990    Smokeless tobacco: Never Used   Substance and Sexual Activity    Alcohol use: Yes     Alcohol/week: 1 0 standard drink     Types: 1 Cans of beer per week    Drug use: Yes     Frequency: 7 0 times per week     Types: Marijuana    Sexual activity: Yes     Partners: Female   Other Topics Concern    Not on file   Social History Narrative    Not on file     Social Determinants of Health     Financial Resource Strain: Not on file   Food Insecurity: Not on file   Transportation Needs: Not on file   Physical Activity: Not on file   Stress: Not on file   Social Connections: Not on file   Intimate Partner Violence: Not on file   Housing Stability: Not on file       History reviewed  No pertinent family history      No Known Allergies    Current Outpatient Medications   Medication Sig Dispense Refill    aspirin 81 mg chewable tablet Chew 1 tablet (81 mg total) daily 90 tablet 3    Cholecalciferol 25 MCG (1000 UT) tablet Take 1,000 Units by mouth daily      metoprolol succinate (TOPROL-XL) 25 mg 24 hr tablet Take 1 tablet (25 mg total) by mouth daily 90 tablet 3    rosuvastatin (CRESTOR) 20 MG tablet Take 1 tablet (20 mg total) by mouth daily 90 tablet 3    sacubitril-valsartan (ENTRESTO) 49-51 MG TABS Take 1 tablet by mouth 2 (two) times a day 180 tablet 3     No current facility-administered medications for this visit  /70 (BP Location: Left arm, Patient Position: Sitting, Cuff Size: Standard)   Pulse 72   Ht 5' 7" (1 702 m)   Wt 83 5 kg (184 lb)   SpO2 97%   BMI 28 82 kg/m²     Review of Systems   All other systems reviewed and are negative  Physical Exam  Vitals reviewed  Constitutional:       General: He is not in acute distress  Appearance: Normal appearance  He is well-developed  He is not ill-appearing, toxic-appearing or diaphoretic  Neck:      Vascular: No JVD  Cardiovascular:      Rate and Rhythm: Normal rate and regular rhythm  Heart sounds: Normal heart sounds  No murmur heard  No gallop  No S3 or S4 sounds  Pulmonary:      Effort: Pulmonary effort is normal  No tachypnea, accessory muscle usage or respiratory distress  Breath sounds: Normal breath sounds  No wheezing, rhonchi or rales  Abdominal:      General: Bowel sounds are normal       Palpations: Abdomen is soft  Tenderness: There is no abdominal tenderness  There is no guarding or rebound  Musculoskeletal:      Cervical back: Neck supple  Right lower leg: No edema  Left lower leg: No edema  Skin:     General: Skin is warm and dry  Coloration: Skin is not jaundiced  Neurological:      Mental Status: He is alert  Lab Results   Component Value Date    K 4 8 01/20/2022     01/20/2022    CO2 26 01/20/2022    BUN 14 01/20/2022    CREATININE 1 11 01/20/2022    CALCIUM 8 7 01/20/2022    ALT 40 12/17/2021    AST 24 12/17/2021       No results found for: CHOL, HDL, LDLCALC, TRIG    Lab Results   Component Value Date    WBC 8 21 12/22/2021    HGB 12 9 12/22/2021    HCT 40 5 12/22/2021     12/22/2021     Cardiac studies:   Cardiac MRI - 1/18/2022:  Impression:  1   Mildly increased left ventricle size with moderate to severely reduced left ventricle systolic function  Ejection fraction measures 34%  Akinesis at the basal and mid inferoseptal, inferior, inferolateral and anterolateral walls  Subendocardial   delayed enhancement at the basal inferoseptal, inferior and inferolateral wall and at the mid inferior wall  Additional extensive epicardial enhancement at the mid inferolateral wall  Findings are consistent with ischemic cardiomyopathy in the posterior   descending and left circumflex distribution  Extensive epicardial enhancement at the mid inferolateral wall suggests additional component nonischemic cardiomyopathy, possibly secondary to viral myocarditis  2  Normal right ventricle size with normal right ventricle systolic function  Ejection fraction measuring 52%  3  Mild left atrial enlargement  4  Mitral regurgitation    [facility] - 12/22/2021:  Coronary Findings  Diagnostic  Dominance: Right      Left Main   The vessel exhibits minimal luminal irregularities  Left Anterior Descending   The vessel exhibits minimal luminal irregularities  First Diagonal Branch   The vessel exhibits minimal luminal irregularities  1st Diag lesion is 90% stenosed  Left Circumflex   Prox Cx lesion is 100% stenosed  The lesion is diffuse  The lesion is chronically occluded  Right Coronary Artery   The vessel is ectatic  Prox RCA lesion is 30% stenosed  ALCON flow is 3  Mid RCA lesion is 40% stenosed  ALCON flow is 3  Intervention  No interventions have been documented  TTE - 11/12/2021:  Left Ventricle Left ventricular cavity size is mildly dilated  Wall thickness is mildly increased  There is increased trabeculation in the apex of the LV myocardium suggestive of LV non compaction morphology  The left ventricular ejection fraction is 20%  Systolic function is severely reduced  There is akinesis and thinning of the LV myocardium from the mid to basal aspect of the inferior wall   The mid to basal anterior wall and inferolateral wall is severely hypokinetic  Diastolic function is moderately abnormal, consistent with grade II (pseudonormal) relaxation  Right Ventricle Right ventricular cavity size is normal  Systolic function is normal  Wall thickness is normal    Left Atrium The atrium is mildly dilated (35-41 mL/m2)  Right Atrium The atrium is normal in size  Aortic Valve The aortic valve is trileaflet  The leaflets are not thickened  The leaflets are not calcified  The leaflets exhibit normal mobility  There is mild regurgitation  There is no evidence of stenosis  Mitral Valve There is mild thickening of the anterior leaflet and posterior leaflet with mild chordal involvement  The valve chordae all appear to attach to a single very prominent papillary muscle suggestive of parachute mitral valve variant, however visualization was poor despite the use of definity US contrast  The annulus is mildly dilated  There is mild to moderate regurgitation  There is no evidence of stenosis  Tricuspid Valve Tricuspid valve structure is normal  There is no evidence of regurgitation  There is no evidence of stenosis  Pulmonic Valve Pulmonic valve structure is normal  There is trace regurgitation  There is no evidence of stenosis  Ascending Aorta The aortic root is normal in size  The ascending aorta is normal in size  IVC/SVC The inferior vena cava is normal in size  Pericardium There is no pericardial effusion  The pericardium is normal in appearance  ASSESSMENT AND PLAN:  Sonja Velez was seen today for follow-up  Diagnoses and all orders for this visit:    Coronary artery disease involving native coronary artery of native heart without angina pectoris  -     Lipid Panel with Direct LDL reflex    Dilated cardiomyopathy ([facility] [address] ): 79-year-old gentleman with a history of hypertension and hyperlipidemia who presents for follow up of cardiomyopathy    Previous exercise stress testing in 2021 was nondiagnostic due to failure to achieve 85% MPHR  TTE from 11/2021 with severely reduced LVEF with akinesis and thinning of the basal-mid inferior wall and severe hypokinesis of the basal-mid anterior and inferolateral wall  Increased trabeculation of the apex was noted raising the suspicion for LV noncompaction  LHC in 12/2021 with chronically occluded LCx, 90% D1 small vessel disease, and nonobstructive CAD of the RCA  Cardiac MRI with LVEF 34% and findings consistent with ischemic CM in the Cx distribution and epicardial enhancement in inferolateral wall  Findings are out of proportion to his coronary disease  Etiology mixed ischemic and nonischemic cardiomyopathy  No history of significant alcohol or illicit drug use with cocaine or amphetamines  No known history of myocarditis  Cardiac MRI without suggestion for noncompaction which was originally suggested by echo  Plan:  -Continue Entresto 49-51 mg  Would like to increase dose but he has applied for assistance and is currently waiting to hear back  Samples of Entresto 49-51 mg provided today  Continue metoprolol succinate 25 mg daily  Potassium 4 8 and will defer Spironolactone at this time   -Well-compensated on exam off loop diuretic   -No anticoagulation indicated at this time given no suggestion of noncompaction on MRI  -Repeat Echo scheduled for 3/18/22 to assess need for ICD  -Continue aspirin 81 mg  Continue Crestor 20 mg and check lipid panel      Lew Staton MD

## 2022-02-09 ENCOUNTER — APPOINTMENT (OUTPATIENT)
Dept: LAB | Facility: HOSPITAL | Age: 57
End: 2022-02-09
Attending: INTERNAL MEDICINE
Payer: COMMERCIAL

## 2022-02-09 LAB
CHOLEST SERPL-MCNC: 160 MG/DL
HDLC SERPL-MCNC: 33 MG/DL
LDLC SERPL CALC-MCNC: 99 MG/DL (ref 0–100)
TRIGL SERPL-MCNC: 140 MG/DL

## 2022-02-09 PROCEDURE — 36415 COLL VENOUS BLD VENIPUNCTURE: CPT | Performed by: INTERNAL MEDICINE

## 2022-02-09 PROCEDURE — 80061 LIPID PANEL: CPT | Performed by: INTERNAL MEDICINE

## 2022-03-18 ENCOUNTER — HOSPITAL ENCOUNTER (OUTPATIENT)
Dept: NON INVASIVE DIAGNOSTICS | Facility: CLINIC | Age: 57
Discharge: HOME | End: 2022-03-18
Payer: COMMERCIAL

## 2022-03-18 VITALS
WEIGHT: 184 LBS | BODY MASS INDEX: 28.88 KG/M2 | HEIGHT: 67 IN | SYSTOLIC BLOOD PRESSURE: 110 MMHG | DIASTOLIC BLOOD PRESSURE: 70 MMHG | HEART RATE: 63 BPM

## 2022-03-18 DIAGNOSIS — I42.9 CARDIOMYOPATHY, UNSPECIFIED TYPE (HCC): ICD-10-CM

## 2022-03-18 LAB
AORTIC ROOT: 3.6 CM
APICAL FOUR CHAMBER EJECTION FRACTION: 39 %
E WAVE DECELERATION TIME: 226 MS
FRACTIONAL SHORTENING: 11 % (ref 28–44)
INTERVENTRICULAR SEPTUM IN DIASTOLE (PARASTERNAL SHORT AXIS VIEW): 1 CM
INTERVENTRICULAR SEPTUM: 1 CM (ref 0.53–1)
LEFT ATRIUM AREA SYSTOLE SINGLE PLANE A4C: 10.6 CM2
LEFT ATRIUM SIZE: 4 CM
LEFT INTERNAL DIMENSION IN SYSTOLE: 4.9 CM (ref 2.94–4.45)
LEFT VENTRICLE DIASTOLIC VOLUME (MOD BIPLANE): 175 ML (ref 94.53–212.88)
LEFT VENTRICLE SYSTOLIC VOLUME (MOD BIPLANE): 108 ML
LEFT VENTRICULAR INTERNAL DIMENSION IN DIASTOLE: 5.5 CM (ref 4.83–7.19)
LEFT VENTRICULAR POSTERIOR WALL IN END DIASTOLE: 0.8 CM (ref 0.52–0.99)
LEFT VENTRICULAR STROKE VOLUME: 35 ML
LV EF: 38 %
LVSV (TEICH): 35 ML
MV E'TISSUE VEL-SEP: 4 CM/S
MV PEAK A VEL: 0.84 M/S
MV PEAK E VEL: 68 CM/S
MV STENOSIS PRESSURE HALF TIME: 66 MS
MV VALVE AREA P 1/2 METHOD: 3.33 CM2
RIGHT ATRIUM AREA SYSTOLE A4C: 11.5 CM2
RIGHT VENTRICLE ID DIMENSION: 3.7 CM
SL CV LV DIAS VOL ENDO Z SCORE: 0.72
SL CV LV EF: 35
SL CV PED ECHO LEFT VENTRICLE DIASTOLIC VOLUME (MOD BIPLANE) 2D: 150 ML
SL CV PED ECHO LEFT VENTRICLE SYSTOLIC VOLUME (MOD BIPLANE) 2D: 115 ML
TR MAX PG: 14 MMHG
TR PEAK VELOCITY: 1.9 M/S
TRICUSPID VALVE PEAK REGURGITATION VELOCITY: 1.86 M/S
Z-SCORE OF INTERVENTRICULAR SEPTUM IN END DIASTOLE: 1.96
Z-SCORE OF LEFT VENTRICULAR DIMENSION IN END DIASTOLE: -0.68
Z-SCORE OF LEFT VENTRICULAR DIMENSION IN END SYSTOLE: 2.41
Z-SCORE OF LEFT VENTRICULAR POSTERIOR WALL IN END DIASTOLE: 0.39

## 2022-03-18 PROCEDURE — 93321 DOPPLER ECHO F-UP/LMTD STD: CPT | Performed by: INTERNAL MEDICINE

## 2022-03-18 PROCEDURE — 93325 DOPPLER ECHO COLOR FLOW MAPG: CPT | Performed by: INTERNAL MEDICINE

## 2022-03-18 PROCEDURE — 93325 DOPPLER ECHO COLOR FLOW MAPG: CPT

## 2022-03-18 PROCEDURE — 93321 DOPPLER ECHO F-UP/LMTD STD: CPT

## 2022-03-18 PROCEDURE — 93308 TTE F-UP OR LMTD: CPT | Performed by: INTERNAL MEDICINE

## 2022-03-18 PROCEDURE — 93308 TTE F-UP OR LMTD: CPT

## 2022-03-21 NOTE — TELEPHONE ENCOUNTER
Patient called back stating "Dr   Blinda Poisson called me"    I said okay let me take a look  *When patient called in it came up Rheumatology*    I checked referrals, upcoming/ missed appointments, nothing from Ortho or [facility]  I advised it might have been a mistake   He said "Well who made the mistake why would they call me"    I advised again it may have been a mistake      He then asked for Cardiologys phone number

## 2022-03-23 ENCOUNTER — TELEPHONE (OUTPATIENT)
Dept: CARDIOLOGY CLINIC | Facility: CLINIC | Age: 57
End: 2022-03-23

## 2022-03-25 ENCOUNTER — TELEPHONE (OUTPATIENT)
Dept: CARDIOLOGY CLINIC | Facility: CLINIC | Age: 57
End: 2022-03-25

## 2022-03-28 ENCOUNTER — TELEPHONE (OUTPATIENT)
Dept: CARDIOLOGY CLINIC | Facility: CLINIC | Age: 57
End: 2022-03-28

## 2022-06-27 ENCOUNTER — TELEPHONE (OUTPATIENT)
Dept: CARDIOLOGY CLINIC | Facility: CLINIC | Age: 57
End: 2022-06-27

## 2022-09-01 ENCOUNTER — OFFICE VISIT (OUTPATIENT)
Dept: CARDIOLOGY CLINIC | Facility: CLINIC | Age: 57
End: 2022-09-01
Payer: COMMERCIAL

## 2022-09-01 VITALS
HEIGHT: 67 IN | SYSTOLIC BLOOD PRESSURE: 122 MMHG | HEART RATE: 67 BPM | OXYGEN SATURATION: 98 % | WEIGHT: 176 LBS | TEMPERATURE: 98.5 F | BODY MASS INDEX: 27.62 KG/M2 | DIASTOLIC BLOOD PRESSURE: 70 MMHG

## 2022-09-01 DIAGNOSIS — I42.9 CARDIOMYOPATHY, UNSPECIFIED TYPE (HCC): ICD-10-CM

## 2022-09-01 DIAGNOSIS — I42.0 DILATED CARDIOMYOPATHY (HCC): Primary | ICD-10-CM

## 2022-09-01 DIAGNOSIS — E78.2 MIXED HYPERLIPIDEMIA: ICD-10-CM

## 2022-09-01 DIAGNOSIS — I25.10 CORONARY ARTERY DISEASE INVOLVING NATIVE CORONARY ARTERY OF NATIVE HEART WITHOUT ANGINA PECTORIS: ICD-10-CM

## 2022-09-01 PROCEDURE — 99213 OFFICE O/P EST LOW 20 MIN: CPT | Performed by: INTERNAL MEDICINE

## 2022-09-01 NOTE — PATIENT INSTRUCTIONS
Please continue your current cardiac medications as prescribe: aspirin, rosuvastatin, metoprolol, Entresto    Please have blood work so we can see if your cholesterol medication needs to be adjusted    Thank you for choosing [address]  Please call our office or use [facility] with any questions

## 2022-09-01 NOTE — PROGRESS NOTES
[facility]    CHIEF COMPLAINT:   Chief Complaint   Patient presents with    Shortness of Breath    Follow-up       HPI:  Kellie Calvillo is a 56 y o  male with a past medical history of tobacco abuse, hypertension, hyperlipidemia, coronary artery disease, mixed cardiomyopathy who presents today for follow-up  History of hypertension diagnosed 20+ years ago  Treated with amlodipine with good control  History of hyperlipidemia on rosuvastatin  EST in April 2021 at [facility] for mid-upper back pain which noted below average functional capacity  He reached 70% of his MPHR and his stress ECG was nondiagnostic  Baseline resting ECG with inferior and lateral T-wave inversions were noted  TTE performed on 11/12/2021 which revealed mildly dilated LV, increased trabeculation of the LV apex and severely reduced systolic function with estimated LVEF 20%  Basal to mid inferior wall akinesis and thinning  Basal to mid anterior wall and inferolateral wall severe hypokinesis  Seen in the office in 12/2021 and sent for [facility] which revealed chronically occluded LCx, D1 90% proximal stenosis (small vessel), 40% mid RCA, 30% prox RCA, LAD with MLI  Started on [facility] in January 2022  Repeat TTE in March 2022 with LVEF 35%  Interval history:  He is taking all his medications as prescribed  He recently got a job as a  at [place] [employer]  For the most part he is able to perform all his activities but does get short of breath going up hills and longer distance  He has remained well-compensated off of diuretics  He denies any fatigue, lightheadedness, visual changes, chest pain, palpitations, orthopnea, PND, leg swelling      The following portions of the patient's history were reviewed and updated as appropriate: allergies, current medications, past family history, past medical history, past social history, past surgical history, and problem list     SINCE LAST OV I REVIEWED WITH THE PATIENT THE INTERIM LABS, TEST RESULTS, CONSULTANT(S) NOTES AND PERFORMED AN INTERIM REVIEW OF HISTORY    History reviewed  No pertinent past medical history  Past Surgical History:   Procedure Laterality Date    CARDIAC CATHETERIZATION Left 12/22/2021    Procedure: CARDIAC CORONARY ANGIOGRAM;  Surgeon: Diandra Lee DO;  Location: [facility]; Service: Cardiology       Social History     Socioeconomic History    Marital status: Single     Spouse name: Not on file    Number of children: Not on file    Years of education: Not on file    Highest education level: Not on file   Occupational History    Not on file   Tobacco Use    Smoking status: Current Every Day Smoker     Start date: 12/14/1990    Smokeless tobacco: Never Used   Substance and Sexual Activity    Alcohol use: Yes     Alcohol/week: 1 0 standard drink     Types: 1 Cans of beer per week    Drug use: Yes     Frequency: 7 0 times per week     Types: Marijuana    Sexual activity: Yes     Partners: Female   Other Topics Concern    Not on file   Social History Narrative    Not on file     Social Determinants of Health     Financial Resource Strain: Not on file   Food Insecurity: Not on file   Transportation Needs: Not on file   Physical Activity: Not on file   Stress: Not on file   Social Connections: Not on file   Intimate Partner Violence: Not on file   Housing Stability: Not on file       History reviewed  No pertinent family history      No Known Allergies    Current Outpatient Medications   Medication Sig Dispense Refill    aspirin 81 mg chewable tablet Chew 1 tablet (81 mg total) daily 90 tablet 3    Cholecalciferol 25 MCG (1000 UT) tablet Take 1,000 Units by mouth daily      metoprolol succinate (TOPROL-XL) 25 mg 24 hr tablet Take 1 tablet (25 mg total) by mouth daily 90 tablet 3    rosuvastatin (CRESTOR) 20 MG tablet Take 1 tablet (20 mg total) by mouth daily 90 tablet 3     No current facility-administered medications for this visit        /70 (BP Location: Left arm, Patient Position: Sitting, Cuff Size: Standard)   Pulse 67   Temp 98 5 °F (36 9 °C)   Ht 5' 7" (1 702 m)   Wt 79 8 kg (176 lb)   SpO2 98%   BMI 27 57 kg/m²     Review of Systems   All other systems reviewed and are negative  Physical Exam  Vitals reviewed  Constitutional:       General: He is not in acute distress  Appearance: Normal appearance  He is not toxic-appearing  Neck:      Vascular: No JVD  Cardiovascular:      Rate and Rhythm: Normal rate and regular rhythm  Heart sounds: Normal heart sounds  No murmur heard  No gallop  No S3 or S4 sounds  Pulmonary:      Effort: Pulmonary effort is normal  No tachypnea, accessory muscle usage or respiratory distress  Breath sounds: Normal breath sounds  No wheezing, rhonchi or rales  Abdominal:      General: Bowel sounds are normal       Palpations: Abdomen is soft  Tenderness: There is no abdominal tenderness  There is no guarding or rebound  Musculoskeletal:      Right lower leg: No edema  Left lower leg: No edema  Skin:     General: Skin is warm  Coloration: Skin is not jaundiced  Neurological:      Mental Status: He is alert          Lab Results   Component Value Date    K 4 8 01/20/2022     01/20/2022    CO2 26 01/20/2022    BUN 14 01/20/2022    CREATININE 1 11 01/20/2022    CALCIUM 8 7 01/20/2022    ALT 40 12/17/2021    AST 24 12/17/2021       Lab Results   Component Value Date    HDL 33 (L) 02/09/2022    LDLCALC 99 02/09/2022    TRIG 140 02/09/2022       Lab Results   Component Value Date    WBC 8 21 12/22/2021    HGB 12 9 12/22/2021    HCT 40 5 12/22/2021     12/22/2021     Cardiac studies:  Results for orders placed or performed in visit on 09/01/22   POCT ECG    Impression    Normal sinus rhythm  Possible inferior infarct, age undetermined  Nonspecific T-wave abnormality     TTE-03/18/2022:    Left Ventricle: Left ventricular cavity size is normal  Wall thickness is normal  The left ventricular ejection fraction is 35%  Systolic function is moderately reduced  Diastolic function is mildly abnormal, consistent with grade I (abnormal) relaxation    The following segments are aneurysmal: basal inferolateral, basal anterolateral, mid inferolateral and mid anterolateral     The following segments are dyskinetic: apical lateral     The following segments are akinetic: basal inferior and mid inferior    All other segments are normal     Mitral Valve: There is restricted mobility/tethering of the posterior leaflet due to regional wall akinesis There is mild to moderate regurgitation        Cardiac MRI - 1/18/2022:  Impression:  1  Mildly increased left ventricle size with moderate to severely reduced left ventricle systolic function  Ejection fraction measures 34%  Akinesis at the basal and mid inferoseptal, inferior, inferolateral and anterolateral walls  Subendocardial   delayed enhancement at the basal inferoseptal, inferior and inferolateral wall and at the mid inferior wall  Additional extensive epicardial enhancement at the mid inferolateral wall  Findings are consistent with ischemic cardiomyopathy in the posterior   descending and left circumflex distribution  Extensive epicardial enhancement at the mid inferolateral wall suggests additional component nonischemic cardiomyopathy, possibly secondary to viral myocarditis  2  Normal right ventricle size with normal right ventricle systolic function  Ejection fraction measuring 52%  3  Mild left atrial enlargement  4  Mitral regurgitation    [facility] - 12/22/2021:  Coronary Findings  Diagnostic  Dominance: Right      Left Main   The vessel exhibits minimal luminal irregularities  Left Anterior Descending   The vessel exhibits minimal luminal irregularities  First Diagonal Branch   The vessel exhibits minimal luminal irregularities  1st Diag lesion is 90% stenosed     Left Circumflex   Prox Cx lesion is 100% stenosed  The lesion is diffuse  The lesion is chronically occluded  Right Coronary Artery   The vessel is ectatic  Prox RCA lesion is 30% stenosed  ALCON flow is 3  Mid RCA lesion is 40% stenosed  ALCON flow is 3  Intervention  No interventions have been documented  TTE - 11/12/2021:  Left Ventricle Left ventricular cavity size is mildly dilated  Wall thickness is mildly increased  There is increased trabeculation in the apex of the LV myocardium suggestive of LV non compaction morphology  The left ventricular ejection fraction is 20%  Systolic function is severely reduced  There is akinesis and thinning of the LV myocardium from the mid to basal aspect of the inferior wall  The mid to basal anterior wall and inferolateral wall is severely hypokinetic  Diastolic function is moderately abnormal, consistent with grade II (pseudonormal) relaxation  Right Ventricle Right ventricular cavity size is normal  Systolic function is normal  Wall thickness is normal    Left Atrium The atrium is mildly dilated (35-41 mL/m2)  Right Atrium The atrium is normal in size  Aortic Valve The aortic valve is trileaflet  The leaflets are not thickened  The leaflets are not calcified  The leaflets exhibit normal mobility  There is mild regurgitation  There is no evidence of stenosis  Mitral Valve There is mild thickening of the anterior leaflet and posterior leaflet with mild chordal involvement  The valve chordae all appear to attach to a single very prominent papillary muscle suggestive of parachute mitral valve variant, however visualization was poor despite the use of definity US contrast  The annulus is mildly dilated  There is mild to moderate regurgitation  There is no evidence of stenosis  Tricuspid Valve Tricuspid valve structure is normal  There is no evidence of regurgitation  There is no evidence of stenosis     Pulmonic Valve Pulmonic valve structure is normal  There is trace regurgitation  There is no evidence of stenosis  Ascending Aorta The aortic root is normal in size  The ascending aorta is normal in size  IVC/SVC The inferior vena cava is normal in size  Pericardium There is no pericardial effusion  The pericardium is normal in appearance  ASSESSMENT AND PLAN:  Divine Asencio was seen today for shortness of breath and follow-up  Diagnoses and all orders for this visit:    Dilated cardiomyopathy ([address] )  -     POCT ECG  -     Ambulatory referral to Cardiac Electrophysiology; Future    Mixed hyperlipidemia  -     Lipid Panel with Direct LDL reflex    Cardiomyopathy, unspecified type ([address] )    Coronary artery disease involving native coronary artery of native heart without angina pectoris      21-year-old gentleman with a history of hypertension and hyperlipidemia who presents for follow up of cardiomyopathy  Previous exercise stress testing in 2021 was nondiagnostic due to failure to achieve 85% MPHR  TTE from 11/2021 with severely reduced LVEF with akinesis and thinning of the basal-mid inferior wall and severe hypokinesis of the basal-mid anterior and inferolateral wall  Increased trabeculation of the apex was noted raising the suspicion for LV noncompaction  LHC in 12/2021 with chronically occluded LCx, 90% D1 small vessel disease, and nonobstructive CAD of the RCA  Cardiac MRI with LVEF 34% and findings consistent with ischemic CM in the Cx distribution and epicardial enhancement in inferolateral wall  LV dysfunction is out of proportion to the degree of his coronary artery disease  He denies any history of significant alcohol, cocaine, amphetamine abuse  No known history of myocarditis but certainly possibility as suggested by cardiac MRI  There was no evidence for noncompaction on MRI which had initially been suggested by transthoracic echo  Continue Entresto 49-51 mg twice daily, metoprolol succinate 25 mg daily    Continue aspirin and atorvastatin for CAD   Now due for repeat lipid panel to assess need for statin dose adjustment  He has no anginal symptoms  He has been stable off any loop diuretic  Repeat LVEF assessment 35% by transthoracic echo  He is NYHA II with slight limitation with activity and an LVEF 35% - mixed ischemic and nonischemic  Per our previous discussions he is still undecided but willing to meet with EP in consultation to discuss primary prevention ICD       Vishnu Ornelas MD

## 2022-09-02 PROCEDURE — 93000 ELECTROCARDIOGRAM COMPLETE: CPT | Performed by: INTERNAL MEDICINE

## 2022-09-26 NOTE — PROGRESS NOTES
Consultation - Electrophysiology-Cardiology (EP)   Alida Officer 56 y o  male MRN: [id number]  Unit/Bed#:  Encounter: [id number]      9  Chronic systolic heart failure ([facility] [address] )     2  Dilated cardiomyopathy ([facility] )  Ambulatory referral to Cardiac Electrophysiology   3  Palpitations  POCT ECG   4  Coronary artery disease involving native coronary artery of native heart without angina pectoris     5  Mixed hyperlipidemia     6  Marijuana use     7  S/P cardiac cath     8  Tobacco abuse     9   Primary hypertension           Consults  Physician Requesting Consult: Yanelis Stein MD   Reason for Consult / Principal Problem:  Evaluation for ICD      Assessment/Plan        Chronic systolic heart failure , LVEF 20%  CAD-ischemic cardiomyopathy  Mixed cardiomyopathy   History of tobacco abuse   Marijuana use  Hypertension for 20 years  Mixed hyperlipidemia         Chronic systolic heart failure   Ischemic cardiomyopathy, chronically occluded left circumflex, 1st diagonal 90% stenosis, RCA 40% stenosis  LVEF 30%   NYHA class 2-3  Optimal medical therapy-metoprolol,   Primary prevention device   Shared decision making done with patient and primary cardiologist   Patient was on Entresto and has recently discontinued the same   Recommend follow-up with primary cardiologist and heart failure service to restart affordable medication    Patient is a candidate for dual-chamber ICD, left side, MRI compatible   Set up for device once patient is able to make up his mind      Hypertension  Twenty years   Currently 128/72   On metoprolol       Mixed hyperlipidemia  On rosuvastatin  No myositis or myalgia      CAD   On aspirin, beta-blocker, statin  Need to quit smoking      Tobacco abuse   Recommend complete cessation      Marijuana use  Once again recommended to discontinue if possible        Summary of my recommendation for the patient   Patient is a candidate for dual-chamber ICD, left side, MRI compatible   Set up for device once patient is able to make up his mind        History of Present Illness   HPI: Ryland Guerra is a 56y o  year old male has been referred to me by Dr Carmen Guajardo for the evaluation of ICD    The patient has significant medical illnesses which include  Chronic systolic heart failure , LVEF 20%  CAD-ischemic cardiomyopathy  Mixed cardiomyopathy   History of tobacco abuse   Marijuana use  Hypertension for 20 years  Mixed hyperlipidemia       The patient is reasonably compensated at this time   He is not complaining of angina, orthopnea, PND   He does have occasional leg swellings  His not complaining of palpitations, presyncope or syncope   He does have exertional intolerance    He has recently been having difficulty with acquiring Oseas Herter  He is due to follow-up with cardiologist to find alternatives     He is a smoker  He also uses marijuana every day   He uses 1 drink in a week      His not yet decided about proceeding with the ICD      Historical Information   History reviewed  No pertinent past medical history  Past Surgical History:   Procedure Laterality Date   • CARDIAC CATHETERIZATION Left 12/22/2021    Procedure: CARDIAC CORONARY ANGIOGRAM;  Surgeon: Jacqui Elkins DO;  Location: [facility];   Service: Cardiology     Social History     Substance and Sexual Activity   Alcohol Use Not Currently   • Alcohol/week: 1 0 standard drink   • Types: 1 Cans of beer per week     Social History     Substance and Sexual Activity   Drug Use Yes   • Frequency: 7 0 times per week   • Types: Marijuana     Social History     Tobacco Use   Smoking Status Current Every Day Smoker   • Start date: 12/14/1990   Smokeless Tobacco Never Used     Social History     Socioeconomic History   • Marital status: Single     Spouse name: Not on file   • Number of children: Not on file   • Years of education: Not on file   • Highest education level: Not on file   Occupational History   • Not on file   Tobacco Use   • Smoking status: Current Every Day Smoker     Start date: 12/14/1990   • Smokeless tobacco: Never Used   Substance and Sexual Activity   • Alcohol use: Not Currently     Alcohol/week: 1 0 standard drink     Types: 1 Cans of beer per week   • Drug use: Yes     Frequency: 7 0 times per week     Types: Marijuana   • Sexual activity: Yes     Partners: Female   Other Topics Concern   • Not on file   Social History Narrative   • Not on file     Social Determinants of Health     Financial Resource Strain: Not on file   Food Insecurity: Not on file   Transportation Needs: Not on file   Physical Activity: Not on file   Stress: Not on file   Social Connections: Not on file   Intimate Partner Violence: Not on file   Housing Stability: Not on file       Family History:History reviewed  No pertinent family history  Meds/Allergies      No current facility-administered medications for this visit  (Not in a hospital admission)      No Known Allergies        Objective   Vitals:   Visit Vitals  /72 (BP Location: Right arm, Patient Position: Sitting, Cuff Size: Standard)   Pulse 73   Ht 5' 7" (1 702 m)   Wt 81 6 kg (180 lb)   BMI 28 19 kg/m²   Smoking Status Current Every Day Smoker   BSA 1 93 m²      Vitals:    09/28/22 1533   Weight: 81 6 kg (180 lb)   [unfilled]    Invasive Devices  Report    None                   ROS  Review of Systems   All other systems reviewed and are negative  As described in my history of present illness        PHYSICAL EXAM  Physical Exam  Vitals reviewed  Constitutional:       General: He is not in acute distress  Appearance: Normal appearance  He is normal weight  HENT:      Head: Normocephalic and atraumatic  Right Ear: External ear normal       Left Ear: External ear normal       Nose: Nose normal       Mouth/Throat:      Pharynx: Oropharynx is clear  Eyes:      General: No scleral icterus  Extraocular Movements: Extraocular movements intact        Conjunctiva/sclera: Conjunctivae normal       Pupils: Pupils are equal, round, and reactive to light  Cardiovascular:      Rate and Rhythm: Normal rate and regular rhythm  Heart sounds: Normal heart sounds  No murmur heard  Pulmonary:      Effort: No respiratory distress  Breath sounds: Normal breath sounds  Abdominal:      Palpations: Abdomen is soft  Musculoskeletal:         General: Swelling present  Cervical back: Neck supple  No rigidity  Skin:     Coloration: Skin is not jaundiced  Findings: No bruising  Neurological:      Mental Status: He is oriented to person, place, and time  Mental status is at baseline  Cranial Nerves: No cranial nerve deficit  Psychiatric:         Mood and Affect: Mood normal          Behavior: Behavior normal          Thought Content: Thought content normal          Judgment: Judgment normal                LAB RESULTS:      CBC:  Results from Last 12 Months   Lab Units 12/22/21  0710 12/17/21  1301   WBC Thousand/uL 8 21 10 89*   HEMOGLOBIN g/dL 12 9 13 5   HEMATOCRIT % 40 5 42 7   MCV fL 88 86   PLATELETS Thousands/uL 195 220   MCH pg 27 9 27 3   MCHC g/dL 31 9 31 6   RDW % 14 7 14 6   MPV fL 10 1 10 4   NRBC AUTO /100 WBCs  --  0        CMP:  Results from Last 12 Months   Lab Units 01/20/22  1240 12/22/21  0710 12/17/21  1301   POTASSIUM mmol/L 4 8 4 3 4 3   CHLORIDE mmol/L 103 110* 104   CO2 mmol/L 26 27 27   BUN mg/dL 14 16 15   CREATININE mg/dL 1 11 0 99 0 90   CALCIUM mg/dL 8 7 9 1 9 3   AST U/L  --   --  24   ALT U/L  --   --  40   ALK PHOS U/L  --   --  78   EGFR ml/min/1 73sq m 74 85 96        Magnesium:   No results for input(s): MG in the last 8784 hours  A1C:  No results for input(s): HGBA1C in the last 8784 hours  TSH:  No results for input(s): TSH in the last 8784 hours  TSH 3rd Gen:  No results for input(s): [id number] in the last 8784 hours  PT/INR:  No results for input(s): PROTIME, INR in the last 8784 hours      Lipid Panel:  Results from Last 12 Months   Lab Units 02/09/22  1030   CHOLESTEROL mg/dL 160   TRIGLYCERIDES mg/dL 140   HDL mg/dL 33*            Cardiac MRI  1/18/2022    Findings:    1  Mildly increased left ventricle end-diastolic volume with moderately to severely reduced left ventricle systolic function  Ejection fraction measuring 34%  Akinesis at the basal and mid inferoseptal, inferior, inferolateral and anterolateral walls  2  Normal right ventricle end-diastolic volume  Normal right ventricle systolic function ejection fraction measuring 52%  3  The aortic, mitral, and tricuspid valves open without restriction  There is mitral regurgitation, however cine MRI is inaccurate in the qualitative assessment of valvular regurgitation  4  The left atrium is mildly enlarged  The aortic root is normal in size  5  There is no evidence of myocardial edema  6  Delayed post-gadolinium imaging demonstrates subendocardial delayed enhancement at the basal inferoseptal, inferior and inferolateral wall and at the mid inferior wall  There is a thin strip of epicardial enhancement at the mid inferolateral wall      IMPRESSION:  Impression:  1  Mildly increased left ventricle size with moderate to severely reduced left ventricle systolic function  Ejection fraction measures 34%  Akinesis at the basal and mid inferoseptal, inferior, inferolateral and anterolateral walls  Subendocardial   delayed enhancement at the basal inferoseptal, inferior and inferolateral wall and at the mid inferior wall  Additional extensive epicardial enhancement at the mid inferolateral wall  Findings are consistent with ischemic cardiomyopathy in the posterior   descending and left circumflex distribution  Extensive epicardial enhancement at the mid inferolateral wall suggests additional component nonischemic cardiomyopathy, possibly secondary to viral myocarditis  2  Normal right ventricle size with normal right ventricle systolic function  Ejection fraction measuring 52%    3  Mild left atrial enlargement  4  Mitral regurgitation    JUDI  No results found for this or any previous visit  Echocardiogram      Results for orders placed during the hospital encounter of 03/18/22    Echo follow up/limited w/ contrast if indicated    Interpretation Summary  •  Left Ventricle: Left ventricular cavity size is normal  Wall thickness is normal  The left ventricular ejection fraction is 35%  Systolic function is moderately reduced  Diastolic function is mildly abnormal, consistent with grade I (abnormal) relaxation  •  The following segments are aneurysmal: basal inferolateral, basal anterolateral, mid inferolateral and mid anterolateral   •  The following segments are dyskinetic: apical lateral   •  The following segments are akinetic: basal inferior and mid inferior  •  All other segments are normal   •  Mitral Valve: There is restricted mobility/tethering of the posterior leaflet due to regional wall akinesis There is mild to moderate regurgitation  Results for orders placed during the hospital encounter of 11/12/21    Echo complete w/ contrast if indicated    Interpretation Summary  •  Left Ventricle: Left ventricular cavity size is mildly dilated  Wall thickness is mildly increased  There is increased trabeculation in the apex of the LV myocardium suggestive of LV non compaction morphology  The left ventricular ejection fraction is 20%  Systolic function is severely reduced  There is akinesis and thinning of the LV myocardium from the mid to basal aspect of the inferior wall  The mid to basal anterior wall and inferolateral wall is severely hypokinetic  Diastolic function is moderately abnormal, consistent with grade II (pseudonormal) relaxation  •  Left Atrium: The atrium is mildly dilated (35-41 mL/m2)  •  Aortic Valve: There is mild regurgitation  •  Mitral Valve: There is mild thickening of the anterior leaflet and posterior leaflet with mild chordal involvement    The valve chordae all appear to attach to a single very prominent papillary muscle suggestive of parachute mitral valve variant, however visualization was poor despite the use of definity US contrast  The annulus is mildly dilated  There is mild to moderate regurgitation  Stress Test  4/7/2021    This result has an attachment that is not available     A Quang protocol stress test was performed  Below average functional   capacity for age and gender   The patient exercised for 6 min and 58 sec  The patient had a maximal HR of 130 bpm (78 % of MPHR) 8 6 METS     The patient experienced no angina during the test   Abnormal resting EKG with inferior and lateral T wave inversions  Test was terminated due to documented drop in blood pressure with   exercise   Cannot exclude that this was a measurement error  Stress ECG was non diagnostic due to failure to achieve 85% MPHR and   baseline ECG abnormalities  Occasional PVC's at rest and with exercise       Consider alternate stress modality  Resting ECG   ECG is abnormal  T wave inversion within inferolateral leads  The ECG shows normal sinus rhythm  The ECG axis is normal  Consider possible criteria for LVH  The ECG shows multifocal premature ventricular contractions  Stress Findings   A Quang protocol stress test was performed  The patient reached stage 3 of the protocol after exercising for 6 min and 58 sec  The patient had a maximal HR of 130 bpm (78 % of MPHR) 8 6 METS  The patient experienced no angina during the test  The test was stopped because the technologist observed hypotension  The patient reported dyspnea and fatigue during the stress test  These symtpoms resolved during recovery with rest  No other cardiac symptoms were reported during the stress test  Resting systemic hypertension  Blood pressure demonstrated a variable response and heart rate demonstrated an exaggerated response to stress   The patient's heart rate recovery was normal  BP initally demonstrated a hypertensive response to stress during stage 1 of the stress test  During stage 2 of the stress test BP demonstrated a hypotensive response to stress (>10mmHg)  Stress ECG   No ST deviation was noted beyond baseline measures  Arrhythmias during stress: occasional multifocal PVCs  Arrhythmias during recovery: occasional multifocal PVCs  The ECG was not diagnostic due to failure to achieve 85% MAPHR  Prior Study   There is no prior study available for comparison  Cardiac catheterization  12/22/2021          HOLTER MONITOR: 24 HOUR/48 HOUR MONITORS  No results found for this or any previous visit  AMB extended holter monitor  No results found for this or any previous visit  DEVICE CHECK:       No results found for this or any previous visit  Code Status: [unfilled]  Advance Directive and Living Will:      Power of :    POLST:      Counseling / Coordination of Care  Proceeded with detailed discussion on need for device    Patient is going to get back to us once he admitted mental decision      [employer]

## 2022-09-28 ENCOUNTER — CONSULT (OUTPATIENT)
Dept: CARDIOLOGY CLINIC | Facility: CLINIC | Age: 57
End: 2022-09-28
Payer: COMMERCIAL

## 2022-09-28 VITALS
SYSTOLIC BLOOD PRESSURE: 128 MMHG | WEIGHT: 180 LBS | HEIGHT: 67 IN | DIASTOLIC BLOOD PRESSURE: 72 MMHG | HEART RATE: 73 BPM | BODY MASS INDEX: 28.25 KG/M2

## 2022-09-28 DIAGNOSIS — I50.22 CHRONIC SYSTOLIC HEART FAILURE (HCC): ICD-10-CM

## 2022-09-28 DIAGNOSIS — I42.0 DILATED CARDIOMYOPATHY (HCC): ICD-10-CM

## 2022-09-28 DIAGNOSIS — E78.2 MIXED HYPERLIPIDEMIA: ICD-10-CM

## 2022-09-28 DIAGNOSIS — F12.90 MARIJUANA USE: ICD-10-CM

## 2022-09-28 DIAGNOSIS — R00.2 PALPITATIONS: ICD-10-CM

## 2022-09-28 DIAGNOSIS — Z98.890 S/P CARDIAC CATH: ICD-10-CM

## 2022-09-28 DIAGNOSIS — I10 PRIMARY HYPERTENSION: ICD-10-CM

## 2022-09-28 DIAGNOSIS — I25.10 CORONARY ARTERY DISEASE INVOLVING NATIVE CORONARY ARTERY OF NATIVE HEART WITHOUT ANGINA PECTORIS: ICD-10-CM

## 2022-09-28 DIAGNOSIS — Z72.0 TOBACCO ABUSE: ICD-10-CM

## 2022-09-28 PROBLEM — I51.9 CARDIOPATHY: Chronic | Status: RESOLVED | Noted: 2021-12-22 | Resolved: 2022-09-28

## 2022-09-28 PROCEDURE — 99244 OFF/OP CNSLTJ NEW/EST MOD 40: CPT | Performed by: INTERNAL MEDICINE

## 2022-09-28 PROCEDURE — 93000 ELECTROCARDIOGRAM COMPLETE: CPT | Performed by: INTERNAL MEDICINE

## 2022-09-28 NOTE — LETTER
October 9, 2022     Cass De Santiago, [address]    Patient: Gustavo Oquendo   YOB: 1965   Date of Visit: 9/28/2022       Dear Dr Best Adames: Thank you for referring Gustavo Oquendo to me for evaluation  Below are my notes for this consultation  If you have questions, please do not hesitate to call me  I look forward to following your patient along with you  Sincerely,        Torrey Ortega MD        CC: MD Kim Soto MD  10/9/2022 10:05 AM  Sign when Signing Visit   Consultation - Electrophysiology-Cardiology (EP)   Gustavo Oquendo 56 y o  male MRN: [id number]  Unit/Bed#:  Encounter: [id number]      8  Dilated cardiomyopathy [facility])  Ambulatory referral to Cardiac Electrophysiology   2  Palpitations  POCT ECG   3  Coronary artery disease involving native coronary artery of native heart without angina pectoris     4   Mixed hyperlipidemia           Consults  Physician Requesting Consult: Gudelia Bray MD   Reason for Consult / Principal Problem:  Evaluation for ICD      Assessment/Plan        Chronic systolic heart failure , LVEF 20%  Mixed cardiomyopathy   History of tobacco abuse   Hypertension for 20 years  Mixed hyperlipidemia         Chronic systolic heart failure   Ischemic cardiomyopathy, chronically occluded left circumflex, 1st diagonal 90% stenosis, RCA 40% stenosis  LVEF 30%   NYHA class 2-3  Optimal medical therapy-metoprolol,   Primary prevention device   Shared decision making done with patient and primary cardiologist   Patient was on Entresto and has recently discontinued the same   Recommend follow-up with primary cardiologist and heart failure service to restart affordable medication    Patient is a candidate for dual-chamber ICD, left side, MRI compatible   Set up for device once patient is able to make up his mind      Hypertension  Twenty years   Currently 128/72   On metoprolol       Mixed hyperlipidemia  On rosuvastatin  No myositis or myalgia      Tobacco abuse   Recommend complete cessation      Marijuana use  Once again recommended to discontinue if possible        Summary of my recommendation for the patient   Patient is a candidate for dual-chamber ICD, left side, MRI compatible   Set up for device once patient is able to make up his mind        History of Present Illness   HPI: Trent Hermosillo is a 56y o  year old male has been referred to me by Dr Paz Toro for the evaluation of ICD    The patient has significant medical illnesses which include  Chronic systolic heart failure , LVEF 20%  Mixed cardiomyopathy   History of tobacco abuse   Hypertension for 20 years  Mixed hyperlipidemia     The patient is reasonably compensated at this time   He is not complaining of angina, orthopnea, PND   He does have occasional leg swellings  His not complaining of palpitations, presyncope or syncope   He does have exertional intolerance    He has recently been having difficulty with acquiring Hali Adolfo  He is due to follow-up with cardiologist to find alternatives     He is a smoker  He also uses marijuana every day   He uses 1 drink in a week      His not yet decided about proceeding with the ICD      Historical Information   History reviewed  No pertinent past medical history  Past Surgical History:   Procedure Laterality Date   • CARDIAC CATHETERIZATION Left 12/22/2021    Procedure: CARDIAC CORONARY ANGIOGRAM;  Surgeon: Noble Cramer DO;  Location: [facility];   Service: Cardiology     Social History     Substance and Sexual Activity   Alcohol Use Not Currently   • Alcohol/week: 1 0 standard drink   • Types: 1 Cans of beer per week     Social History     Substance and Sexual Activity   Drug Use Yes   • Frequency: 7 0 times per week   • Types: Marijuana     Social History     Tobacco Use   Smoking Status Current Every Day Smoker   • Start date: 12/14/1990   Smokeless Tobacco Never Used     Social History Socioeconomic History   • Marital status: Single     Spouse name: Not on file   • Number of children: Not on file   • Years of education: Not on file   • Highest education level: Not on file   Occupational History   • Not on file   Tobacco Use   • Smoking status: Current Every Day Smoker     Start date: 12/14/1990   • Smokeless tobacco: Never Used   Substance and Sexual Activity   • Alcohol use: Not Currently     Alcohol/week: 1 0 standard drink     Types: 1 Cans of beer per week   • Drug use: Yes     Frequency: 7 0 times per week     Types: Marijuana   • Sexual activity: Yes     Partners: Female   Other Topics Concern   • Not on file   Social History Narrative   • Not on file     Social Determinants of Health     Financial Resource Strain: Not on file   Food Insecurity: Not on file   Transportation Needs: Not on file   Physical Activity: Not on file   Stress: Not on file   Social Connections: Not on file   Intimate Partner Violence: Not on file   Housing Stability: Not on file       Family History:History reviewed  No pertinent family history  Meds/Allergies      No current facility-administered medications for this visit  (Not in a hospital admission)      No Known Allergies        Objective   Vitals:   Visit Vitals  /72 (BP Location: Right arm, Patient Position: Sitting, Cuff Size: Standard)   Pulse 73   Ht 5' 7" (1 702 m)   Wt 81 6 kg (180 lb)   BMI 28 19 kg/m²   Smoking Status Current Every Day Smoker   BSA 1 93 m²      Vitals:    09/28/22 1533   Weight: 81 6 kg (180 lb)   [unfilled]    Invasive Devices  Report    None                   ROS  Review of Systems   All other systems reviewed and are negative  As described in my history of present illness        PHYSICAL EXAM  Physical Exam  Vitals reviewed  Constitutional:       General: He is not in acute distress  Appearance: Normal appearance  He is normal weight  HENT:      Head: Normocephalic and atraumatic        Right Ear: External ear normal       Left Ear: External ear normal       Nose: Nose normal       Mouth/Throat:      Pharynx: Oropharynx is clear  Eyes:      General: No scleral icterus  Extraocular Movements: Extraocular movements intact  Conjunctiva/sclera: Conjunctivae normal       Pupils: Pupils are equal, round, and reactive to light  Cardiovascular:      Rate and Rhythm: Normal rate and regular rhythm  Heart sounds: Normal heart sounds  No murmur heard  Pulmonary:      Effort: No respiratory distress  Breath sounds: Normal breath sounds  Abdominal:      Palpations: Abdomen is soft  Musculoskeletal:         General: Swelling present  Cervical back: Neck supple  No rigidity  Skin:     Coloration: Skin is not jaundiced  Findings: No bruising  Neurological:      Mental Status: He is oriented to person, place, and time  Mental status is at baseline  Cranial Nerves: No cranial nerve deficit  Psychiatric:         Mood and Affect: Mood normal          Behavior: Behavior normal          Thought Content: Thought content normal          Judgment: Judgment normal                LAB RESULTS:      CBC:  Results from Last 12 Months   Lab Units 12/22/21  0710 12/17/21  1301   WBC Thousand/uL 8 21 10 89*   HEMOGLOBIN g/dL 12 9 13 5   HEMATOCRIT % 40 5 42 7   MCV fL 88 86   PLATELETS Thousands/uL 195 220   MCH pg 27 9 27 3   MCHC g/dL 31 9 31 6   RDW % 14 7 14 6   MPV fL 10 1 10 4   NRBC AUTO /100 WBCs  --  0        CMP:  Results from Last 12 Months   Lab Units 01/20/22  1240 12/22/21  0710 12/17/21  1301   POTASSIUM mmol/L 4 8 4 3 4 3   CHLORIDE mmol/L 103 110* 104   CO2 mmol/L 26 27 27   BUN mg/dL 14 16 15   CREATININE mg/dL 1 11 0 99 0 90   CALCIUM mg/dL 8 7 9 1 9 3   AST U/L  --   --  24   ALT U/L  --   --  40   ALK PHOS U/L  --   --  78   EGFR ml/min/1 73sq m 74 85 96        Magnesium:   No results for input(s): MG in the last 8784 hours  A1C:  No results for input(s):  HGBA1C in the last 8784 hours  TSH:  No results for input(s): TSH in the last 8784 hours  TSH 3rd Gen:  No results for input(s): [id number] in the last 8784 hours  PT/INR:  No results for input(s): PROTIME, INR in the last 8784 hours  Lipid Panel:  Results from Last 12 Months   Lab Units 02/09/22  1030   CHOLESTEROL mg/dL 160   TRIGLYCERIDES mg/dL 140   HDL mg/dL 33*            Cardiac MRI  1/18/2022    Findings:    1  Mildly increased left ventricle end-diastolic volume with moderately to severely reduced left ventricle systolic function  Ejection fraction measuring 34%  Akinesis at the basal and mid inferoseptal, inferior, inferolateral and anterolateral walls  2  Normal right ventricle end-diastolic volume  Normal right ventricle systolic function ejection fraction measuring 52%  3  The aortic, mitral, and tricuspid valves open without restriction  There is mitral regurgitation, however cine MRI is inaccurate in the qualitative assessment of valvular regurgitation  4  The left atrium is mildly enlarged  The aortic root is normal in size  5  There is no evidence of myocardial edema  6  Delayed post-gadolinium imaging demonstrates subendocardial delayed enhancement at the basal inferoseptal, inferior and inferolateral wall and at the mid inferior wall  There is a thin strip of epicardial enhancement at the mid inferolateral wall      IMPRESSION:  Impression:  1  Mildly increased left ventricle size with moderate to severely reduced left ventricle systolic function  Ejection fraction measures 34%  Akinesis at the basal and mid inferoseptal, inferior, inferolateral and anterolateral walls  Subendocardial   delayed enhancement at the basal inferoseptal, inferior and inferolateral wall and at the mid inferior wall  Additional extensive epicardial enhancement at the mid inferolateral wall  Findings are consistent with ischemic cardiomyopathy in the posterior   descending and left circumflex distribution  Extensive epicardial enhancement at the mid inferolateral wall suggests additional component nonischemic cardiomyopathy, possibly secondary to viral myocarditis  2  Normal right ventricle size with normal right ventricle systolic function  Ejection fraction measuring 52%  3  Mild left atrial enlargement  4  Mitral regurgitation    JUDI  No results found for this or any previous visit  Echocardiogram      Results for orders placed during the hospital encounter of 03/18/22    Echo follow up/limited w/ contrast if indicated    Interpretation Summary  •  Left Ventricle: Left ventricular cavity size is normal  Wall thickness is normal  The left ventricular ejection fraction is 35%  Systolic function is moderately reduced  Diastolic function is mildly abnormal, consistent with grade I (abnormal) relaxation  •  The following segments are aneurysmal: basal inferolateral, basal anterolateral, mid inferolateral and mid anterolateral   •  The following segments are dyskinetic: apical lateral   •  The following segments are akinetic: basal inferior and mid inferior  •  All other segments are normal   •  Mitral Valve: There is restricted mobility/tethering of the posterior leaflet due to regional wall akinesis There is mild to moderate regurgitation  Results for orders placed during the hospital encounter of 11/12/21    Echo complete w/ contrast if indicated    Interpretation Summary  •  Left Ventricle: Left ventricular cavity size is mildly dilated  Wall thickness is mildly increased  There is increased trabeculation in the apex of the LV myocardium suggestive of LV non compaction morphology  The left ventricular ejection fraction is 20%  Systolic function is severely reduced  There is akinesis and thinning of the LV myocardium from the mid to basal aspect of the inferior wall  The mid to basal anterior wall and inferolateral wall is severely hypokinetic   Diastolic function is moderately abnormal, consistent with grade II (pseudonormal) relaxation  •  Left Atrium: The atrium is mildly dilated (35-41 mL/m2)  •  Aortic Valve: There is mild regurgitation  •  Mitral Valve: There is mild thickening of the anterior leaflet and posterior leaflet with mild chordal involvement  The valve chordae all appear to attach to a single very prominent papillary muscle suggestive of parachute mitral valve variant, however visualization was poor despite the use of definity US contrast  The annulus is mildly dilated  There is mild to moderate regurgitation  Stress Test  4/7/2021    This result has an attachment that is not available     A Quang protocol stress test was performed  Below average functional   capacity for age and gender   The patient exercised for 6 min and 58 sec  The patient had a maximal HR of 130 bpm (78 % of MPHR) 8 6 METS     The patient experienced no angina during the test   Abnormal resting EKG with inferior and lateral T wave inversions  Test was terminated due to documented drop in blood pressure with   exercise   Cannot exclude that this was a measurement error  Stress ECG was non diagnostic due to failure to achieve 85% MPHR and   baseline ECG abnormalities  Occasional PVC's at rest and with exercise       Consider alternate stress modality  Resting ECG   ECG is abnormal  T wave inversion within inferolateral leads  The ECG shows normal sinus rhythm  The ECG axis is normal  Consider possible criteria for LVH  The ECG shows multifocal premature ventricular contractions  Stress Findings   A Quang protocol stress test was performed  The patient reached stage 3 of the protocol after exercising for 6 min and 58 sec  The patient had a maximal HR of 130 bpm (78 % of MPHR) 8 6 METS  The patient experienced no angina during the test  The test was stopped because the technologist observed hypotension   The patient reported dyspnea and fatigue during the stress test  These symtpoms resolved during recovery with rest  No other cardiac symptoms were reported during the stress test  Resting systemic hypertension  Blood pressure demonstrated a variable response and heart rate demonstrated an exaggerated response to stress  The patient's heart rate recovery was normal  BP initally demonstrated a hypertensive response to stress during stage 1 of the stress test  During stage 2 of the stress test BP demonstrated a hypotensive response to stress (>10mmHg)  Stress ECG   No ST deviation was noted beyond baseline measures  Arrhythmias during stress: occasional multifocal PVCs  Arrhythmias during recovery: occasional multifocal PVCs  The ECG was not diagnostic due to failure to achieve 85% MAPHR  Prior Study   There is no prior study available for comparison  Cardiac catheterization  12/22/2021          HOLTER MONITOR: 24 HOUR/48 HOUR MONITORS  No results found for this or any previous visit  AMB extended holter monitor  No results found for this or any previous visit  DEVICE CHECK:       No results found for this or any previous visit  Code Status: [unfilled]  Advance Directive and Living Will:      Power of :    POLST:      Counseling / Coordination of Care  Proceeded with detailed discussion on need for device    Patient is going to get back to us once he admitted mental decision      Vicki Sofia

## 2022-10-09 PROBLEM — I10 PRIMARY HYPERTENSION: Status: ACTIVE | Noted: 2022-10-09

## 2022-10-09 PROBLEM — I50.22 CHRONIC SYSTOLIC HEART FAILURE (HCC): Status: ACTIVE | Noted: 2022-10-09

## 2022-10-09 PROBLEM — Z72.0 TOBACCO ABUSE: Status: ACTIVE | Noted: 2022-10-09

## 2022-10-09 PROBLEM — F12.90 MARIJUANA USE: Status: ACTIVE | Noted: 2022-10-09

## 2022-10-19 DIAGNOSIS — I42.9 CARDIOMYOPATHY, UNSPECIFIED TYPE (HCC): ICD-10-CM

## 2022-10-19 DIAGNOSIS — I25.10 CORONARY ARTERY DISEASE INVOLVING NATIVE CORONARY ARTERY OF NATIVE HEART WITHOUT ANGINA PECTORIS: ICD-10-CM

## 2022-10-19 RX ORDER — ROSUVASTATIN CALCIUM 20 MG/1
TABLET, COATED ORAL
Qty: 90 TABLET | Refills: 0 | Status: SHIPPED | OUTPATIENT
Start: 2022-10-19

## 2022-10-19 RX ORDER — METOPROLOL SUCCINATE 25 MG/1
TABLET, EXTENDED RELEASE ORAL
Qty: 90 TABLET | Refills: 0 | Status: SHIPPED | OUTPATIENT
Start: 2022-10-19

## 2022-10-31 ENCOUNTER — APPOINTMENT (EMERGENCY)
Dept: RADIOLOGY | Facility: HOSPITAL | Age: 57
End: 2022-10-31

## 2022-10-31 ENCOUNTER — HOSPITAL ENCOUNTER (EMERGENCY)
Facility: HOSPITAL | Age: 57
Discharge: HOME | End: 2022-10-31
Attending: EMERGENCY MEDICINE

## 2022-10-31 VITALS
OXYGEN SATURATION: 100 % | RESPIRATION RATE: 16 BRPM | TEMPERATURE: 98.6 F | DIASTOLIC BLOOD PRESSURE: 80 MMHG | HEIGHT: 67 IN | HEART RATE: 82 BPM | SYSTOLIC BLOOD PRESSURE: 139 MMHG | WEIGHT: 180 LBS | BODY MASS INDEX: 28.25 KG/M2

## 2022-10-31 DIAGNOSIS — M25.562 LEFT KNEE PAIN: Primary | ICD-10-CM

## 2022-10-31 RX ORDER — IBUPROFEN 600 MG/1
600 TABLET ORAL EVERY 6 HOURS PRN
Qty: 30 TABLET | Refills: 0 | Status: SHIPPED | OUTPATIENT
Start: 2022-10-31

## 2022-10-31 RX ORDER — LIDOCAINE HYDROCHLORIDE 10 MG/ML
10 INJECTION, SOLUTION EPIDURAL; INFILTRATION; INTRACAUDAL; PERINEURAL ONCE
Status: COMPLETED | OUTPATIENT
Start: 2022-10-31 | End: 2022-10-31

## 2022-10-31 RX ORDER — KETOROLAC TROMETHAMINE 30 MG/ML
15 INJECTION, SOLUTION INTRAMUSCULAR; INTRAVENOUS ONCE
Status: COMPLETED | OUTPATIENT
Start: 2022-10-31 | End: 2022-10-31

## 2022-10-31 RX ADMIN — KETOROLAC TROMETHAMINE 15 MG: 30 INJECTION, SOLUTION INTRAMUSCULAR; INTRAVENOUS at 10:02

## 2022-10-31 RX ADMIN — LIDOCAINE HYDROCHLORIDE 10 ML: 10 INJECTION, SOLUTION EPIDURAL; INFILTRATION; INTRACAUDAL; PERINEURAL at 10:02

## 2022-10-31 NOTE — ED PROVIDER NOTES
History  Chief Complaint   Patient presents with   • Knee Pain     Pt reports left knee pain since Friday, denies injury, took motrin @ 260     56year old male presents for evaluation of left knee pain which has been worsening for the past 3 days  Patient states that he has had similar pain in the past secondary to osteoarthritis, but that this pain is significantly worse than usual  He has been taking ibuprofen for the pain with the last dose 2 days ago  Pain worsens with flexion and with weight bearing  No fevers or chills  No rash  No recent illness  History provided by:  Patient  Knee Pain  Location:  Knee  Time since incident:  3 days  Injury: no    Knee location:  L knee  Pain details:     Radiates to:  Does not radiate    Severity:  Severe    Onset quality:  Gradual    Duration:  3 days    Timing:  Constant    Progression:  Worsening  Chronicity:  Recurrent  Prior injury to area:  No  Relieved by:  Nothing  Worsened by:  Flexion and bearing weight  Ineffective treatments:  NSAIDs  Associated symptoms: swelling    Associated symptoms: no fever    Risk factors comment:  Osteoarthritis      Prior to Admission Medications   Prescriptions Last Dose Informant Patient Reported? Taking? Cholecalciferol 25 MCG (1000 UT) tablet 10/31/2022 at Unknown time Self Yes Yes   Sig: Take 1,000 Units by mouth daily   aspirin 81 mg chewable tablet 10/31/2022 at Unknown time Self No Yes   Sig: Chew 1 tablet (81 mg total) daily   metoprolol succinate (TOPROL-XL) 25 mg 24 hr tablet 10/31/2022 at Unknown time  No Yes   Sig: Take 1 tablet by mouth once daily   rosuvastatin (CRESTOR) 20 MG tablet 10/31/2022 at Unknown time  No Yes   Sig: Take 1 tablet by mouth once daily      Facility-Administered Medications: None       History reviewed  No pertinent past medical history      Past Surgical History:   Procedure Laterality Date   • CARDIAC CATHETERIZATION Left 12/22/2021    Procedure: CARDIAC CORONARY ANGIOGRAM;  Surgeon: Kaylie Mora DO;  Location: [facility]; Service: Cardiology       History reviewed  No pertinent family history  I have reviewed and agree with the history as documented  E-Cigarette/Vaping   • E-Cigarette Use Never User      E-Cigarette/Vaping Substances   • Nicotine No    • THC No    • CBD No    • Flavoring No    • Other No    • Unknown No      Social History     Tobacco Use   • Smoking status: Current Every Day Smoker     Packs/day: 1 00     Types: Cigarettes     Start date: 12/14/1990   • Smokeless tobacco: Never Used   Vaping Use   • Vaping Use: Never used   Substance Use Topics   • Alcohol use: Not Currently     Alcohol/week: 1 0 standard drink     Types: 1 Cans of beer per week   • Drug use: Yes     Frequency: 7 0 times per week     Types: Marijuana       Review of Systems   Constitutional: Negative for chills and fever  HENT: Negative for congestion  Respiratory: Negative for cough  Gastrointestinal: Negative for nausea and vomiting  Musculoskeletal: Positive for arthralgias  Skin: Negative for rash and wound  All other systems reviewed and are negative  Physical Exam  Physical Exam  Vitals and nursing note reviewed  Constitutional:       General: He is not in acute distress  Appearance: He is well-developed  He is not toxic-appearing or diaphoretic  HENT:      Head: Normocephalic and atraumatic  Right Ear: External ear normal       Left Ear: External ear normal       Nose: Nose normal    Eyes:      General: No scleral icterus  Pulmonary:      Effort: Pulmonary effort is normal  No respiratory distress  Abdominal:      General: There is no distension  Musculoskeletal:         General: No deformity  Normal range of motion  Comments: Tenderness over anterior left knee  Moderate palpable effusion  No overlying skin changes  ROM reduced secondary to pain  Skin:     Findings: No rash  Neurological:      General: No focal deficit present  Mental Status: He is alert and oriented to person, place, and time  Psychiatric:         Mood and Affect: Mood normal          Vital Signs  ED Triage Vitals [10/31/22 0936]   Temperature Pulse Respirations Blood Pressure SpO2   98 6 °F (37 °C) 82 16 139/80 100 %      Temp Source Heart Rate Source Patient Position - Orthostatic VS BP Location FiO2 (%)   Temporal Monitor Lying Left arm --      Pain Score       10 - Worst Possible Pain           Vitals:    10/31/22 0936   BP: 139/80   Pulse: 82   Patient Position - Orthostatic VS: Lying         Visual Acuity      ED Medications  Medications   ketorolac (TORADOL) injection 15 mg (15 mg Intramuscular Given 10/31/22 1002)   lidocaine (PF) (XYLOCAINE-MPF) 1 % injection 10 mL (10 mL Infiltration Given 10/31/22 1002)       Diagnostic Studies  Results Reviewed     None                 XR knee 3 views left non injury   ED Interpretation by Tasha Caruso MD (10/31 1010)   No acute fractures or dislocations      Final Result by Severa Oh, MD (10/31 1012)      No acute osseous abnormality  Workstation performed: [id number]                    Procedures  Arthrocentesis    Date/Time: 10/31/2022 10:57 AM  Performed by: Tasha Caruso MD  Authorized by: Tasha Caruso MD     Location:  ED  Verbal consent obtained?: Yes    Consent given by:  Patient  Radiology Images displayed and confirmed   If images not available, report reviewed: Yes    Required items: Required blood products, implants, devices and special equipment available    Patient identity confirmed:  Verbally with patient and arm band  Indications:  Pain and joint swelling  Local anesthesia used?: Yes    Anesthesia:  Local infiltration  Local anesthetic:  Lidocaine 1% without epinephrine  Anesthetic total (ml):  1  Preparation: Patient was prepped and draped in usual sterile fashion    Needle size:  18 G  Ultrasound guidance: No    Approach:  Medial  Aspirate amount (ml):  0  Patient tolerance:  Patient tolerated the procedure well with no immediate complications   Unsuccessful attempt             ED Course                               SBIRT 22yo+    Flowsheet Row Most Recent Value   SBIRT (25 yo +)    In order to provide better care to our patients, we are screening all of our patients for alcohol and drug use  Would it be okay to ask you these screening questions? No Filed at: 10/31/2022 8649                    [facility]  Number of Diagnoses or Management Options  Left knee pain: new and requires workup  Diagnosis management comments: 56year old male presents for evaluation of left knee pain  Moderate effusion on exam  Xray unremarkable  Unsuccessful attempt at arthrocentesis  Ace wrap applied with normal distal sensation and perfusion prior to and post placement  NSAIDs  Ortho follow up  Discussed strict return precautions with patient  Amount and/or Complexity of Data Reviewed  Tests in the radiology section of CPT®: ordered and reviewed  Independent visualization of images, tracings, or specimens: yes    Patient Progress  Patient progress: stable      Disposition  Final diagnoses:   Left knee pain     Time reflects when diagnosis was documented in both MDM as applicable and the Disposition within this note     Time User Action Codes Description Comment    10/31/2022 10:59 AM Cristal Nassar Add [M25 562] Left knee pain       ED Disposition     ED Disposition   Discharge    Condition   Stable    Date/Time   Mon Oct 31, 2022 10:59 AM    [address] discharge to home/self care                 Follow-up Information     Follow up With Specialties Details Why Contact Info Additional [address] [facility] [address] [place] [address] Orthopedic Surgery Schedule an appointment as soon as possible for a visit in 1 week for re-evaluation [address] [facility] [id number]  [address] [place] [address] Specialists [address], [address]     [address] Emergency Department Emergency Medicine Go to  If symptoms worsen, fever >100 4F, redness or warmth over the knee, increase in swelling, unable to bear weight on the knee [address] [phone]  [address] [facility] Emergency Department, [address] [facility], [facility] [address]          Patient's Medications   Discharge Prescriptions    IBUPROFEN (MOTRIN) 600 MG TABLET    Take 1 tablet (600 mg total) by mouth every 6 (six) hours as needed for mild pain       Start Date: 10/31/2022End Date: --       Order Dose: 600 mg       Quantity: 30 tablet    Refills: 0           PDMP Review     None          ED Provider  Electronically Signed by           Ema Stroud MD  10/31/22 9320

## 2022-10-31 NOTE — Clinical Note
Rufus Benitez was seen and treated in our emergency department on 10/31/2022  Diagnosis:     Clarence Wong    He may return on this date: 11/03/2022         If you have any questions or concerns, please don't hesitate to call        Rose Ibarra MD    ______________________________           _______________          _______________  Hospital Representative                              Date                                Time

## 2022-10-31 NOTE — DISCHARGE INSTRUCTIONS
Take 600 mg of ibuprofen every 6-8 hours as needed for pain  If you have pain despite this, you can also take 1000 mg acetaminophen (Tylenol) every 8 hours with your ibuprofen  Lidocaine patches are available over-the-counter can be found in the back pain aisle of most pharmacies  Look for 4% lidocaine in the list of the active ingredients  These patches can be placed for 12 hours  After 12 hours, discard the patch  The next patch can be placed 12 hours later

## 2022-11-08 ENCOUNTER — APPOINTMENT (OUTPATIENT)
Dept: RADIOLOGY | Facility: CLINIC | Age: 57
End: 2022-11-08

## 2022-11-08 ENCOUNTER — APPOINTMENT (OUTPATIENT)
Dept: LAB | Facility: HOSPITAL | Age: 57
End: 2022-11-08
Attending: ORTHOPAEDIC SURGERY

## 2022-11-08 VITALS
SYSTOLIC BLOOD PRESSURE: 120 MMHG | BODY MASS INDEX: 28.56 KG/M2 | WEIGHT: 182 LBS | HEIGHT: 67 IN | DIASTOLIC BLOOD PRESSURE: 80 MMHG

## 2022-11-08 DIAGNOSIS — M17.12 PRIMARY OSTEOARTHRITIS OF LEFT KNEE: ICD-10-CM

## 2022-11-08 DIAGNOSIS — M25.462 EFFUSION OF LEFT KNEE: ICD-10-CM

## 2022-11-08 DIAGNOSIS — M25.562 LEFT KNEE PAIN, UNSPECIFIED CHRONICITY: ICD-10-CM

## 2022-11-08 DIAGNOSIS — M25.462 EFFUSION OF LEFT KNEE: Primary | ICD-10-CM

## 2022-11-08 LAB — CRYSTALS SNV QL MICRO: NORMAL

## 2022-11-08 RX ORDER — BUPIVACAINE HYDROCHLORIDE 2.5 MG/ML
1 INJECTION, SOLUTION EPIDURAL; INFILTRATION; INTRACAUDAL
Status: COMPLETED | OUTPATIENT
Start: 2022-11-08 | End: 2022-11-08

## 2022-11-08 RX ORDER — BETAMETHASONE SODIUM PHOSPHATE AND BETAMETHASONE ACETATE 3; 3 MG/ML; MG/ML
6 INJECTION, SUSPENSION INTRA-ARTICULAR; INTRALESIONAL; INTRAMUSCULAR; SOFT TISSUE
Status: COMPLETED | OUTPATIENT
Start: 2022-11-08 | End: 2022-11-08

## 2022-11-08 RX ADMIN — BUPIVACAINE HYDROCHLORIDE 1 ML: 2.5 INJECTION, SOLUTION EPIDURAL; INFILTRATION; INTRACAUDAL at 14:58

## 2022-11-08 RX ADMIN — BETAMETHASONE SODIUM PHOSPHATE AND BETAMETHASONE ACETATE 6 MG: 3; 3 INJECTION, SUSPENSION INTRA-ARTICULAR; INTRALESIONAL; INTRAMUSCULAR; SOFT TISSUE at 14:00

## 2022-11-08 NOTE — PROGRESS NOTES
Assessment:     1  Effusion of left knee    2  Primary osteoarthritis of left knee        Plan:     Problem List Items Addressed This Visit    None     Visit Diagnoses     Effusion of left knee    -  Primary    Relevant Medications    betamethasone acetate-betamethasone sodium phosphate (CELESTONE) injection 6 mg (Completed)    bupivacaine (PF) (MARCAINE) 0 25 % injection 1 mL (Completed)    Other Relevant Orders    XR knee 1 or 2 vw left    Large joint arthrocentesis: L knee (Completed)    Synovial fluid white cell count w/ diff    Synovial fluid, crystal    Lyme Antibody Profile with reflex to WB    Sedimentation rate, automated    C-reactive protein    Rheumatoid Factor (IgA, IgG, IgM)    MATTHIAS Screen w/ Reflex to Titer/Pattern    Primary osteoarthritis of left knee        Relevant Medications    betamethasone acetate-betamethasone sodium phosphate (CELESTONE) injection 6 mg (Completed)    bupivacaine (PF) (MARCAINE) 0 25 % injection 1 mL (Completed)    Other Relevant Orders    Large joint arthrocentesis: L knee (Completed)        Findings consistent with mild osteoarthritis, effusion of left knee  Imaging and prognosis reviewed with patient  Patient knee aspirated 25 cc cloudy yellow fluid followed by cortisone injection, tolerated well, cold compress today  Fluid sent out for cell count and crystals, also lab work for lyme, RF, MATTHIAS, sedrate, CRP  If any labs come back positive for findings patient will be called with instructions  Stationary bike, elliptical for low impact exercise, OTC anti inflammatories for pain  See patient back in 6 weeks unless called  All patient's questions were answered to his satisfaction  This note is created using dictation transcription  It may contain typographical errors, grammatical errors, improperly dictated words, background noise and other errors  Subjective:     Patient ID: Emma Lemus is a 56 y o  male    Chief Complaint:  56 yr old male in for evaluation of left knee pain  Pain started in knee 3 days prior to ED visit 10/31/22  He states he has had similar pain in past secondary to arthritis but wasn't as severe  Chronic intermittent knee pain for past 2-3 years  Attempted aspiration in ED yielded no fluid and given toradol injection  He denies any injury or trauma  His knee still feels swollen and tight  Pain medial-anterior aspect of knee  Pain general ache, throbbing currently  Denies any locking or milagro instability  Denies any lyme, gout, rheumatoid  No treatment in past for his knee's  Usually takes ibuprofen which helps his symptoms  Information on patient's intake form was reviewed  Allergy:  No Known Allergies  Medications:  all current active meds have been reviewed  Past Medical History:  History reviewed  No pertinent past medical history  Past Surgical History:  Past Surgical History:   Procedure Laterality Date   • CARDIAC CATHETERIZATION Left 12/22/2021    Procedure: CARDIAC CORONARY ANGIOGRAM;  Surgeon: Pedro Pablo Stuart DO;  [facility]; Service: Cardiology     Family History:  History reviewed  No pertinent family history  Social History:  Social History     Substance and Sexual Activity   Alcohol Use Not Currently   • Alcohol/week: 1 0 standard drink   • Types: 1 Cans of beer per week     Social History     Substance and Sexual Activity   Drug Use Yes   • Frequency: 7 0 times per week   • Types: Marijuana     Social History     Tobacco Use   Smoking Status Current Every Day Smoker   • Packs/day: 1 00   • Types: Cigarettes   • Start date: 12/14/1990   Smokeless Tobacco Never Used     Review of Systems   Constitutional: Negative for chills and fever  HENT: Negative for ear pain and sore throat  Eyes: Negative for pain and visual disturbance  Respiratory: Negative for cough and shortness of breath  Cardiovascular: Negative for chest pain and palpitations  Gastrointestinal: Negative for abdominal pain and vomiting  Genitourinary: Negative for dysuria and hematuria  Musculoskeletal: Positive for arthralgias (Left knee) and joint swelling (Left knee)  Negative for back pain  Skin: Negative for color change and rash  Neurological: Negative for seizures and syncope  Psychiatric/Behavioral: Negative  All other systems reviewed and are negative  Objective:  BP Readings from Last 1 Encounters:   11/08/22 120/80      Wt Readings from Last 1 Encounters:   11/08/22 82 6 kg (182 lb)      BMI:   Estimated body mass index is 28 51 kg/m² as calculated from the following:    Height as of this encounter: 5' 7" (1 702 m)  Weight as of this encounter: 82 6 kg (182 lb)  BSA:   Estimated body surface area is 1 94 meters squared as calculated from the following:    Height as of this encounter: 5' 7" (1 702 m)  Weight as of this encounter: 82 6 kg (182 lb)  Physical Exam  Vitals and nursing note reviewed  Constitutional:       Appearance: Normal appearance  He is well-developed  HENT:      Head: Normocephalic and atraumatic  Right Ear: External ear normal       Left Ear: External ear normal    Eyes:      Extraocular Movements: Extraocular movements intact  Conjunctiva/sclera: Conjunctivae normal    Pulmonary:      Effort: Pulmonary effort is normal    Musculoskeletal:         General: Tenderness (left knee arthralgia ) present  Cervical back: Neck supple  Left knee: Effusion (Grade 2) present  Instability Tests: Medial Ky test negative and lateral Ky test negative  Skin:     General: Skin is warm  Neurological:      Mental Status: He is alert and oriented to person, place, and time  Psychiatric:         Mood and Affect: Mood normal          Behavior: Behavior normal        Left Knee Exam     Muscle Strength   The patient has normal left knee strength  Tenderness   The patient is experiencing tenderness in the medial joint line and lateral joint line      Range of Motion Extension: 0   Flexion: 130     Tests   Ky:  Medial - negative Lateral - negative  Varus: negative Valgus: negative  Patellar apprehension: negative    Other   Erythema: absent  Scars: absent  Sensation: normal  Pulse: present  Swelling: mild  Effusion: effusion (Grade 2) present    Comments:  Mild crepitation with motion of patella  Varus alignment             I have personally reviewed pertinent films in PACS and my interpretation is xr left knee demonstrates mild medial and patellofemoral arthritis with well maintained joint spaces with minimal spurring   Large joint arthrocentesis: L knee  Universal Protocol:  Consent: Verbal consent obtained    Risks and benefits: risks, benefits and alternatives were discussed  Consent given by: patient  Patient understanding: patient states understanding of the procedure being performed  Site marked: the operative site was marked  Patient identity confirmed: verbally with patient    Supporting Documentation  Indications: pain and joint swelling   Procedure Details  Location: knee - L knee  Preparation: Patient was prepped and draped in the usual sterile fashion  Needle size: 18 G  Ultrasound guidance: no  Approach: Superolateral   Medications administered: 6 mg betamethasone acetate-betamethasone sodium phosphate 6 (3-3) mg/mL; 1 mL bupivacaine (PF) 0 25 % (7 ML 0 5% marcaine injection intra articular )    Aspirate amount: 25 mL  Aspirate: cloudy and yellow  Analysis: fluid sample sent for laboratory analysis    Patient tolerance: patient tolerated the procedure well with no immediate complications  Dressing:  Sterile dressing applied    5 ML 0 25% marcaine as local anesthetic with 25 gauge needle intra articular         Scribe Attestation    I,:  Miguel Angel Duran am acting as a scribe while in the presence of the attending physician :       I,:  Elenita Nobles MD personally performed the services described in this documentation    as scribed in my presence :

## 2022-11-09 LAB
APPEARANCE FLD: ABNORMAL
COLOR FLD: YELLOW
HISTIOCYTES NFR SNV MANUAL: 2 %
LYMPHOCYTES # SNV MANUAL: 2 %
MONOCYTES NFR SNV MANUAL: 9 %
NEUTROPHILS NFR SNV MANUAL: 87 %
SITE: ABNORMAL
TOTAL CELLS COUNTED SPEC: 100
WBC # FLD MANUAL: 6108 /UL (ref 0–200)

## 2022-11-11 ENCOUNTER — APPOINTMENT (OUTPATIENT)
Dept: LAB | Facility: HOSPITAL | Age: 57
End: 2022-11-11
Attending: INTERNAL MEDICINE

## 2022-11-11 DIAGNOSIS — M25.462 EFFUSION OF LEFT KNEE: ICD-10-CM

## 2022-11-11 LAB
ANA SER QL IA: NEGATIVE
B BURGDOR IGG+IGM SER-ACNC: 0.9 AI
CRP SERPL QL: <3 MG/L
ERYTHROCYTE [SEDIMENTATION RATE] IN BLOOD: 34 MM/HOUR (ref 0–19)

## 2022-11-15 LAB
B BURGDOR IGG PATRN SER IB-IMP: POSITIVE
B BURGDOR IGM PATRN SER IB-IMP: NEGATIVE
B BURGDOR18KD IGG SER QL IB: ABNORMAL
B BURGDOR23KD IGG SER QL IB: PRESENT
B BURGDOR23KD IGM SER QL IB: ABNORMAL
B BURGDOR28KD IGG SER QL IB: PRESENT
B BURGDOR30KD IGG SER QL IB: ABNORMAL
B BURGDOR39KD IGG SER QL IB: PRESENT
B BURGDOR39KD IGM SER QL IB: ABNORMAL
B BURGDOR41KD IGG SER QL IB: ABNORMAL
B BURGDOR41KD IGM SER QL IB: ABNORMAL
B BURGDOR45KD IGG SER QL IB: ABNORMAL
B BURGDOR58KD IGG SER QL IB: PRESENT
B BURGDOR66KD IGG SER QL IB: ABNORMAL
B BURGDOR93KD IGG SER QL IB: PRESENT

## 2022-11-21 LAB — MISCELLANEOUS LAB TEST RESULT: NORMAL

## 2023-04-24 RX ORDER — SILDENAFIL 50 MG/1
50 TABLET, FILM COATED ORAL
COMMUNITY
Start: 2022-12-23 | End: 2023-12-23

## 2023-04-24 RX ORDER — ALLOPURINOL 100 MG/1
100 TABLET ORAL DAILY
COMMUNITY
Start: 2022-12-23 | End: 2023-12-23

## 2023-04-28 ENCOUNTER — OFFICE VISIT (OUTPATIENT)
Dept: CARDIOLOGY CLINIC | Facility: CLINIC | Age: 58
End: 2023-04-28

## 2023-04-28 VITALS
BODY MASS INDEX: 29.19 KG/M2 | WEIGHT: 186 LBS | SYSTOLIC BLOOD PRESSURE: 138 MMHG | HEART RATE: 71 BPM | DIASTOLIC BLOOD PRESSURE: 82 MMHG | HEIGHT: 67 IN

## 2023-04-28 DIAGNOSIS — Z72.0 TOBACCO ABUSE: ICD-10-CM

## 2023-04-28 DIAGNOSIS — I42.9 CARDIOMYOPATHY, UNSPECIFIED TYPE (HCC): ICD-10-CM

## 2023-04-28 DIAGNOSIS — E78.2 MIXED HYPERLIPIDEMIA: ICD-10-CM

## 2023-04-28 DIAGNOSIS — I10 PRIMARY HYPERTENSION: Primary | ICD-10-CM

## 2023-04-28 NOTE — PATIENT INSTRUCTIONS
You were seen today in the Cardiology office for follow up  We reviewed indication for a primary prevention ICD  Please continue your current cardiac medications as prescribed  Thank you for choosing [address]  Please call our office or use [employer] with any questions

## 2023-04-28 NOTE — PROGRESS NOTES
[facility]    CHIEF COMPLAINT:   Chief Complaint   Patient presents with   • Follow-up       HPI:  Rohan Patel is a 56 y o  male with a past medical history of tobacco abuse, hypertension, hyperlipidemia, coronary artery disease, mixed cardiomyopathy     History of hypertension diagnosed 20+ years ago  Treated with amlodipine with good control  History of hyperlipidemia on rosuvastatin  EST in April 2021 at [address] for mid-upper back pain which noted below average functional capacity  He reached 70% of his MPHR and his stress ECG was nondiagnostic  Baseline resting ECG with inferior and lateral T-wave inversions were noted  TTE performed on 11/12/2021 which revealed mildly dilated LV, increased trabeculation of the LV apex and severely reduced systolic function with estimated LVEF 20%  Basal to mid inferior wall akinesis and thinning  Basal to mid anterior wall and inferolateral wall severe hypokinesis  Seen in the office in 12/2021 and sent for [facility] which revealed chronically occluded LCx, D1 90% proximal stenosis (small vessel), 40% mid RCA, 30% prox RCA, LAD with MLI  Started on Larnell Verónica in January 2022  Repeat TTE in March 2022 with LVEF 35%  Interval history:  Saw EP in consultation on 9/28/22  Larnell Verónica was taken off his med list at some point but he does report taking this  He did have a follow-up appointment scheduled in March but had to cancel  Reports taking all his medications as prescribed with no reported side effects  He exercises 4-5 times per week using light weights and also does some cardio  Smokes marijuana mixed with tobacco daily  He feels well and denies any fatigue, visual changes, lightheadedness, syncope, chest pain, palpitations, shortness of breath, orthopnea, PND, abdominal distention, lower extremity swelling        The following portions of the patient's history were reviewed and updated as appropriate: allergies, current medications, past family history, past medical history, past social history, past surgical history, and problem list     SINCE LAST OV I REVIEWED WITH THE PATIENT THE INTERIM LABS, TEST RESULTS, CONSULTANT(S) NOTES AND PERFORMED AN INTERIM REVIEW OF HISTORY    History reviewed  No pertinent past medical history  Past Surgical History:   Procedure Laterality Date   • CARDIAC CATHETERIZATION Left 12/22/2021    Procedure: CARDIAC CORONARY ANGIOGRAM;  Surgeon: Dariana Villegas DO;  [facility]; Service: Cardiology       Social History     Socioeconomic History   • Marital status: Single     Spouse name: Not on file   • Number of children: Not on file   • Years of education: Not on file   • Highest education level: Not on file   Occupational History   • Not on file   Tobacco Use   • Smoking status: Every Day     Packs/day: 1 00     Types: Cigarettes     Start date: 12/14/1990   • Smokeless tobacco: Never   Vaping Use   • Vaping Use: Never used   Substance and Sexual Activity   • Alcohol use: Not Currently     Alcohol/week: 1 0 standard drink     Types: 1 Cans of beer per week   • Drug use: Yes     Frequency: 7 0 times per week     Types: Marijuana   • Sexual activity: Yes     Partners: Female   Other Topics Concern   • Not on file   Social History Narrative   • Not on file     Social Determinants of Health     Financial Resource Strain: Not on file   Food Insecurity: Not on file   Transportation Needs: Not on file   Physical Activity: Not on file   Stress: Not on file   Social Connections: Not on file   Intimate Partner Violence: Not on file   Housing Stability: Not on file       No family history on file      No Known Allergies    Current Outpatient Medications   Medication Sig Dispense Refill   • allopurinol (ZYLOPRIM) 100 mg tablet Take 100 mg by mouth daily     • aspirin 81 mg chewable tablet Chew 1 tablet (81 mg total) daily 90 tablet 3   • Cholecalciferol 25 MCG (1000 UT) tablet Take 1,000 Units by mouth daily     • "diclofenac sodium (VOLTAREN) 50 mg EC tablet Take 50 mg by mouth 2 (two) times a day     • ibuprofen (MOTRIN) 600 mg tablet Take 1 tablet (600 mg total) by mouth every 6 (six) hours as needed for mild pain 30 tablet 0   • metoprolol succinate (TOPROL-XL) 25 mg 24 hr tablet Take 1 tablet by mouth once daily 90 tablet 0   • rosuvastatin (CRESTOR) 20 MG tablet Take 1 tablet by mouth once daily 90 tablet 0   • sildenafil (VIAGRA) 50 MG tablet Take 50 mg by mouth       No current facility-administered medications for this visit  /82 (BP Location: Right arm, Patient Position: Sitting, Cuff Size: Standard)   Pulse 71   Ht 5' 7\" (1 702 m)   Wt 84 4 kg (186 lb)   BMI 29 13 kg/m²     Review of Systems   All other systems reviewed and are negative  Physical Exam  Vitals reviewed  Constitutional:       General: He is not in acute distress  Appearance: Normal appearance  He is not toxic-appearing  Neck:      Vascular: No JVD  Cardiovascular:      Rate and Rhythm: Normal rate and regular rhythm  Heart sounds: Normal heart sounds  No murmur heard  No gallop  No S3 or S4 sounds  Pulmonary:      Effort: Pulmonary effort is normal  No tachypnea, accessory muscle usage or respiratory distress  Breath sounds: Normal breath sounds  No wheezing, rhonchi or rales  Abdominal:      General: Bowel sounds are normal       Palpations: Abdomen is soft  Tenderness: There is no abdominal tenderness  There is no guarding or rebound  Musculoskeletal:      Right lower leg: No edema  Left lower leg: No edema  Skin:     General: Skin is warm  Coloration: Skin is not jaundiced  Neurological:      Mental Status: He is alert          Lab Results   Component Value Date    K 4 8 01/20/2022     01/20/2022    CO2 26 01/20/2022    BUN 14 01/20/2022    CREATININE 1 11 01/20/2022    CALCIUM 8 7 01/20/2022    ALT 40 12/17/2021    AST 24 12/17/2021       Lab Results   Component Value Date " HDL 33 (L) 02/09/2022    LDLCALC 99 02/09/2022    TRIG 140 02/09/2022       Lab Results   Component Value Date    WBC 8 21 12/22/2021    HGB 12 9 12/22/2021    HCT 40 5 12/22/2021     12/22/2021     Cardiac studies:  TTE-03/18/2022:  •  Left Ventricle: Left ventricular cavity size is normal  Wall thickness is normal  The left ventricular ejection fraction is 35%  Systolic function is moderately reduced  Diastolic function is mildly abnormal, consistent with grade I (abnormal) relaxation  •  The following segments are aneurysmal: basal inferolateral, basal anterolateral, mid inferolateral and mid anterolateral   •  The following segments are dyskinetic: apical lateral   •  The following segments are akinetic: basal inferior and mid inferior  •  All other segments are normal   •  Mitral Valve: There is restricted mobility/tethering of the posterior leaflet due to regional wall akinesis There is mild to moderate regurgitation        Cardiac MRI - 1/18/2022:  Impression:  1  Mildly increased left ventricle size with moderate to severely reduced left ventricle systolic function  Ejection fraction measures 34%  Akinesis at the basal and mid inferoseptal, inferior, inferolateral and anterolateral walls  Subendocardial   delayed enhancement at the basal inferoseptal, inferior and inferolateral wall and at the mid inferior wall  Additional extensive epicardial enhancement at the mid inferolateral wall  Findings are consistent with ischemic cardiomyopathy in the posterior   descending and left circumflex distribution  Extensive epicardial enhancement at the mid inferolateral wall suggests additional component nonischemic cardiomyopathy, possibly secondary to viral myocarditis  2  Normal right ventricle size with normal right ventricle systolic function  Ejection fraction measuring 52%  3  Mild left atrial enlargement  4   Mitral regurgitation    [facility] - 12/22/2021:  Coronary Findings  Diagnostic  Dominance: Right      Left Main   The vessel exhibits minimal luminal irregularities  Left Anterior Descending   The vessel exhibits minimal luminal irregularities  First Diagonal Branch   The vessel exhibits minimal luminal irregularities  1st Diag lesion is 90% stenosed  Left Circumflex   Prox Cx lesion is 100% stenosed  The lesion is diffuse  The lesion is chronically occluded  Right Coronary Artery   The vessel is ectatic  Prox RCA lesion is 30% stenosed  ALCON flow is 3  Mid RCA lesion is 40% stenosed  ALCON flow is 3  Intervention  No interventions have been documented  TTE - 11/12/2021:  Left Ventricle Left ventricular cavity size is mildly dilated  Wall thickness is mildly increased  There is increased trabeculation in the apex of the LV myocardium suggestive of LV non compaction morphology  The left ventricular ejection fraction is 20%  Systolic function is severely reduced  There is akinesis and thinning of the LV myocardium from the mid to basal aspect of the inferior wall  The mid to basal anterior wall and inferolateral wall is severely hypokinetic  Diastolic function is moderately abnormal, consistent with grade II (pseudonormal) relaxation  Right Ventricle Right ventricular cavity size is normal  Systolic function is normal  Wall thickness is normal    Left Atrium The atrium is mildly dilated (35-41 mL/m2)  Right Atrium The atrium is normal in size  Aortic Valve The aortic valve is trileaflet  The leaflets are not thickened  The leaflets are not calcified  The leaflets exhibit normal mobility  There is mild regurgitation  There is no evidence of stenosis  Mitral Valve There is mild thickening of the anterior leaflet and posterior leaflet with mild chordal involvement    The valve chordae all appear to attach to a single very prominent papillary muscle suggestive of parachute mitral valve variant, however visualization was poor despite the use of definity US contrast  The annulus is mildly dilated  There is mild to moderate regurgitation  There is no evidence of stenosis  Tricuspid Valve Tricuspid valve structure is normal  There is no evidence of regurgitation  There is no evidence of stenosis  Pulmonic Valve Pulmonic valve structure is normal  There is trace regurgitation  There is no evidence of stenosis  Ascending Aorta The aortic root is normal in size  The ascending aorta is normal in size  IVC/SVC The inferior vena cava is normal in size  Pericardium There is no pericardial effusion  The pericardium is normal in appearance  ASSESSMENT AND PLAN:  Cynthia Dickinson was seen today for follow-up  Diagnoses and all orders for this visit:  Cardiomyopathy, unspecified type [facility])  82-year-old gentleman with a history of mixed cardiomyopathy who presents for follow-up  Previous work-up includes exercise stress testing in 2021 was nondiagnostic due to failure to achieve 85% MPHR  TTE from 11/2021 with severely reduced LVEF with akinesis and thinning of the basal-mid inferior wall and severe hypokinesis of the basal-mid anterior and inferolateral wall  Increased trabeculation of the apex was noted raising the suspicion for LV noncompaction  [facility] in 12/2021 with chronically occluded LCx, 90% D1 small vessel disease, and nonobstructive CAD of the RCA  Cardiac MRI with LVEF 34% and findings consistent with ischemic CM in the Cx distribution and epicardial enhancement in inferolateral wall  His LV dysfunction is out of proportion to the degree of his coronary artery disease  He has no significant history of alcohol, cocaine, or amphetamine abuse  No known history of myocarditis but possibility is suggested by cardiac MRI  No evidence for noncompaction on MRI  Plan:  Remains well compensated on his current regimen of Entresto 49-51 mg twice daily and metoprolol succinate 25 mg daily  He has been stable off any loop diuretics  NYHA II-III   He has seen EP in consultation for primary prevention ICD  Reviewed indications for this and he still feels he is not ready to make a decision  We will get in touch with the office should he decide to pursue this  Continue aspirin and atorvastatin for coronary artery disease  He is overdue for repeat lipid panel  He has no anginal complaints and is remaining active  Primary hypertension: His blood pressure is mildly elevated today  He was rushing in late for his appointment so he missed his morning Entresto dose  Tobacco abuse: Smoking cessation advised    Mixed hyperlipidemia: Due for repeat lipid panel - has active order  Continue atorvastatin      Jason De La Rosa MD

## 2023-05-31 ENCOUNTER — TELEPHONE (OUTPATIENT)
Dept: CARDIOLOGY CLINIC | Facility: CLINIC | Age: 58
End: 2023-05-31

## 2023-05-31 NOTE — TELEPHONE ENCOUNTER
Pt called in stated that the [facility] Patient Assistance Program needs info stating that the pt is still taking it  Also, would requesting samples

## 2023-06-09 NOTE — TELEPHONE ENCOUNTER
Patient has called again  Not sure if you were able to look at this or if you can do a letter for assistance program  I wasn't going to give samples if he wasn't able to stay on the Entresto   Please Advise Thank you

## 2023-06-09 NOTE — TELEPHONE ENCOUNTER
Pt LM regarding status of fax for [facility] and samples  Please review and get back to pt as soon as possible

## 2023-06-13 ENCOUNTER — TELEPHONE (OUTPATIENT)
Dept: ENDOCRINOLOGY | Facility: CLINIC | Age: 58
End: 2023-06-13

## 2023-06-13 DIAGNOSIS — I42.9 CARDIOMYOPATHY, UNSPECIFIED TYPE (HCC): Primary | ICD-10-CM

## 2023-07-14 ENCOUNTER — TELEPHONE (OUTPATIENT)
Dept: CARDIOLOGY CLINIC | Facility: CLINIC | Age: 58
End: 2023-07-14

## 2023-07-14 DIAGNOSIS — I42.9 CARDIOMYOPATHY, UNSPECIFIED TYPE (HCC): ICD-10-CM

## 2025-05-08 NOTE — TELEPHONE ENCOUNTER
Patient dropped of paper work 6/13 to be singed by Dr. Umang Burleson and sent to [employer]  Patient called and stated that [facility] claims they never received papers I printed another copy had Dr. Umang Burleson sign again faxed, scanned and mailed to patient No

## (undated) DEVICE — GLIDESHEATH SLENDER STAINLESS STEEL KIT: Brand: GLIDESHEATH SLENDER

## (undated) DEVICE — DGW .035 FC J3MM 260CM TEF: Brand: EMERALD

## (undated) DEVICE — TR BAND RADIAL ARTERY COMPRESSION DEVICE: Brand: TR BAND

## (undated) DEVICE — RADIFOCUS OPTITORQUE ANGIOGRAPHIC CATHETER: Brand: OPTITORQUE

## (undated) DEVICE — GUIDEWIRE WHOLEY HI TORQUE INTERM MOD J .035 145CM